# Patient Record
Sex: FEMALE | Race: OTHER | Employment: FULL TIME | URBAN - METROPOLITAN AREA
[De-identification: names, ages, dates, MRNs, and addresses within clinical notes are randomized per-mention and may not be internally consistent; named-entity substitution may affect disease eponyms.]

---

## 2019-07-03 ENCOUNTER — OFFICE VISIT (OUTPATIENT)
Dept: OBGYN CLINIC | Facility: CLINIC | Age: 29
End: 2019-07-03
Payer: COMMERCIAL

## 2019-07-03 VITALS
BODY MASS INDEX: 46.38 KG/M2 | HEIGHT: 62 IN | HEART RATE: 90 BPM | WEIGHT: 252 LBS | SYSTOLIC BLOOD PRESSURE: 111 MMHG | DIASTOLIC BLOOD PRESSURE: 75 MMHG

## 2019-07-03 DIAGNOSIS — Z87.42 HISTORY OF PCOS: ICD-10-CM

## 2019-07-03 DIAGNOSIS — Z01.419 ENCOUNTER FOR GYNECOLOGICAL EXAMINATION WITHOUT ABNORMAL FINDING: Primary | ICD-10-CM

## 2019-07-03 DIAGNOSIS — Z12.4 SCREENING FOR MALIGNANT NEOPLASM OF CERVIX: ICD-10-CM

## 2019-07-03 PROCEDURE — 99385 PREV VISIT NEW AGE 18-39: CPT | Performed by: OBSTETRICS & GYNECOLOGY

## 2019-07-03 PROCEDURE — 99202 OFFICE O/P NEW SF 15 MIN: CPT | Performed by: OBSTETRICS & GYNECOLOGY

## 2019-07-03 RX ORDER — ERGOCALCIFEROL 1.25 MG/1
CAPSULE ORAL
Refills: 0 | COMMUNITY
Start: 2019-03-28 | End: 2019-08-20

## 2019-07-03 NOTE — PROGRESS NOTES
Emily Thompson is a 33year old female  No LMP recorded (lmp unknown). (Menstrual status: IUD - Intrauterine Device). Patient presents with:  Gyn Exam: Annual and discuss IUD removal, ready to conceive.  Concerns for PCOS  she was told by her peds t Sexual activity: Yes        Partners: Male    Lifestyle      Physical activity:        Days per week: Not on file        Minutes per session: Not on file      Stress: Not on file    Relationships      Social connections:        Talks on phone: Not on file no nodules, no adenopathy  Lymphatic:no abnormal supraclavicular or axillary adenopathy is noted  Breast: normal without palpable masses, tenderness, asymmetry, nipple discharge, nipple retraction or skin changes  Respiratory:  lungs clear to auscultation

## 2019-07-29 ENCOUNTER — APPOINTMENT (OUTPATIENT)
Dept: LAB | Facility: HOSPITAL | Age: 29
End: 2019-07-29
Attending: OBSTETRICS & GYNECOLOGY
Payer: COMMERCIAL

## 2019-07-29 ENCOUNTER — OFFICE VISIT (OUTPATIENT)
Dept: OBGYN CLINIC | Facility: CLINIC | Age: 29
End: 2019-07-29
Payer: COMMERCIAL

## 2019-07-29 VITALS
DIASTOLIC BLOOD PRESSURE: 71 MMHG | WEIGHT: 254 LBS | SYSTOLIC BLOOD PRESSURE: 102 MMHG | BODY MASS INDEX: 46 KG/M2 | HEART RATE: 80 BPM

## 2019-07-29 DIAGNOSIS — Z87.42 HISTORY OF PCOS: ICD-10-CM

## 2019-07-29 DIAGNOSIS — Z30.432 ENCOUNTER FOR REMOVAL OF INTRAUTERINE CONTRACEPTIVE DEVICE: Primary | ICD-10-CM

## 2019-07-29 LAB
EST. AVERAGE GLUCOSE BLD GHB EST-MCNC: 100 MG/DL (ref 68–126)
ESTRADIOL SERPL-MCNC: 42.1 PG/ML
FSH SERPL-ACNC: 5.7 MIU/ML
GLUCOSE BLD-MCNC: 100 MG/DL (ref 70–99)
HBA1C MFR BLD HPLC: 5.1 % (ref ?–5.7)
INSULIN SERPL-ACNC: 37.6 MU/L (ref 3–25)
LH SERPL-ACNC: 4.2 MIU/ML
PATIENT FASTING: YES
TSI SER-ACNC: 2.01 MIU/ML (ref 0.36–3.74)

## 2019-07-29 PROCEDURE — 83001 ASSAY OF GONADOTROPIN (FSH): CPT

## 2019-07-29 PROCEDURE — 82670 ASSAY OF TOTAL ESTRADIOL: CPT

## 2019-07-29 PROCEDURE — 83036 HEMOGLOBIN GLYCOSYLATED A1C: CPT

## 2019-07-29 PROCEDURE — 36415 COLL VENOUS BLD VENIPUNCTURE: CPT

## 2019-07-29 PROCEDURE — 83525 ASSAY OF INSULIN: CPT

## 2019-07-29 PROCEDURE — 82947 ASSAY GLUCOSE BLOOD QUANT: CPT

## 2019-07-29 PROCEDURE — 58301 REMOVE INTRAUTERINE DEVICE: CPT | Performed by: OBSTETRICS & GYNECOLOGY

## 2019-07-29 PROCEDURE — 83002 ASSAY OF GONADOTROPIN (LH): CPT

## 2019-07-29 PROCEDURE — 84443 ASSAY THYROID STIM HORMONE: CPT

## 2019-07-29 NOTE — PROCEDURES
IUD Removal   Pt would like to get pregnant and wants IUD removal.  It has been in place for 5 years. We discussed weight loss to help with ovulation and conception. She will also begin pnv.       Pregnancy Results: negative from n/a test   Consent signed

## 2019-07-31 ENCOUNTER — TELEPHONE (OUTPATIENT)
Dept: OBGYN CLINIC | Facility: CLINIC | Age: 29
End: 2019-07-31

## 2019-07-31 DIAGNOSIS — R73.09 ELEVATED GLUCOSE: Primary | ICD-10-CM

## 2019-07-31 NOTE — TELEPHONE ENCOUNTER
----- Message from Hannah Altman MD sent at 7/31/2019 10:53 AM CDT -----  Pt hormone levels are normal but her fasting glucose is slightly elevated and her fasting insulin is elevated.   Please order a 2 hour GTT for her to see if she is diabetic and info

## 2019-07-31 NOTE — TELEPHONE ENCOUNTER
Pt informed of CAPs recs below and verbalized understanding. Pt provided with # to CS to set up appt for 2 hour gtt. Pt informed she will need to fast before this appt.

## 2019-08-20 ENCOUNTER — OFFICE VISIT (OUTPATIENT)
Dept: INTERNAL MEDICINE CLINIC | Facility: CLINIC | Age: 29
End: 2019-08-20
Payer: COMMERCIAL

## 2019-08-20 VITALS
HEIGHT: 62 IN | WEIGHT: 256 LBS | DIASTOLIC BLOOD PRESSURE: 76 MMHG | SYSTOLIC BLOOD PRESSURE: 113 MMHG | BODY MASS INDEX: 47.11 KG/M2 | HEART RATE: 75 BPM | RESPIRATION RATE: 16 BRPM

## 2019-08-20 DIAGNOSIS — Z00.00 ROUTINE PHYSICAL EXAMINATION: ICD-10-CM

## 2019-08-20 DIAGNOSIS — Z87.42 HISTORY OF PCOS: Primary | ICD-10-CM

## 2019-08-20 PROCEDURE — 99385 PREV VISIT NEW AGE 18-39: CPT | Performed by: INTERNAL MEDICINE

## 2019-08-20 RX ORDER — PRENATAL VIT/IRON FUM/FOLIC AC 27MG-0.8MG
1 TABLET ORAL DAILY
COMMUNITY
End: 2020-06-30

## 2019-08-20 NOTE — ASSESSMENT & PLAN NOTE
Patient here to establish care but no significant medical problems and hence complete physical done today. No significant issues of concern other than weight gain most likely due to PCOS and eczema.   Strict diet controlled to restrict starches–fruits, kasia

## 2019-08-20 NOTE — PROGRESS NOTES
HPI:    Patient ID: Harshal Monteiro is a 33year old female. New patient, establish care. Hx of pcos per pt acounts but has not had medical eval.    Has not had regular medical evaluation. Has been seen by gynecology-Dr. car.     Currently not on any 46.82 kg/m². PHYSICAL EXAM:   Physical Exam   Constitutional: She is oriented to person, place, and time. She appears well-developed and well-nourished.    HENT:   Right Ear: External ear normal.   Left Ear: External ear normal.   Nose: Nose normal.   Mo every meal.  Exercise for about 15 to 20 minutes daily. Labs completed per gynecology reviewed. This was done when she had her IUD in place.   So will repeat the [facility],  and insulin after strict dietary restrictions of starches at least 12 hours prior to

## 2019-08-20 NOTE — PATIENT INSTRUCTIONS
Problem List Items Addressed This Visit        Unprioritized    History of PCOS - Primary    Relevant Orders    INSULIN    FSH    LH (LUTEINIZING HORMONE)    Routine physical examination     Patient here to establish care but no significant medical problem

## 2020-02-05 ENCOUNTER — HOSPITAL ENCOUNTER (EMERGENCY)
Facility: HOSPITAL | Age: 30
Discharge: HOME | End: 2020-02-05
Attending: PHYSICIAN ASSISTANT
Payer: COMMERCIAL

## 2020-02-05 VITALS
HEART RATE: 86 BPM | WEIGHT: 235 LBS | RESPIRATION RATE: 17 BRPM | HEIGHT: 60 IN | TEMPERATURE: 99 F | SYSTOLIC BLOOD PRESSURE: 106 MMHG | BODY MASS INDEX: 46.13 KG/M2 | DIASTOLIC BLOOD PRESSURE: 67 MMHG | OXYGEN SATURATION: 97 %

## 2020-02-05 DIAGNOSIS — R31.9 URINARY TRACT INFECTION WITH HEMATURIA, SITE UNSPECIFIED: Primary | ICD-10-CM

## 2020-02-05 DIAGNOSIS — N39.0 URINARY TRACT INFECTION WITH HEMATURIA, SITE UNSPECIFIED: Primary | ICD-10-CM

## 2020-02-05 DIAGNOSIS — R11.2 NAUSEA VOMITING AND DIARRHEA: ICD-10-CM

## 2020-02-05 DIAGNOSIS — R19.7 NAUSEA VOMITING AND DIARRHEA: ICD-10-CM

## 2020-02-05 DIAGNOSIS — R10.9 ABDOMINAL PAIN, ACUTE: ICD-10-CM

## 2020-02-05 LAB
ALBUMIN SERPL-MCNC: 4.1 G/DL (ref 3.4–5)
ALP LIVER SERPL-CCNC: 81 U/L (ref 37–98)
ALT SERPL-CCNC: 45 U/L (ref 13–56)
ANION GAP SERPL CALC-SCNC: 5 MMOL/L (ref 0–18)
AST SERPL-CCNC: 27 U/L (ref 15–37)
B-HCG UR QL: NEGATIVE
BASOPHILS # BLD AUTO: 0.03 X10(3) UL (ref 0–0.2)
BASOPHILS NFR BLD AUTO: 0.2 %
BILIRUB DIRECT SERPL-MCNC: 0.1 MG/DL (ref 0–0.2)
BILIRUB SERPL-MCNC: 0.5 MG/DL (ref 0.1–2)
BILIRUB UR QL: NEGATIVE
BUN BLD-MCNC: 9 MG/DL (ref 7–18)
BUN/CREAT SERPL: 12.2 (ref 10–20)
CALCIUM BLD-MCNC: 9 MG/DL (ref 8.5–10.1)
CHLORIDE SERPL-SCNC: 109 MMOL/L (ref 98–112)
CO2 SERPL-SCNC: 24 MMOL/L (ref 21–32)
COLOR UR: YELLOW
CREAT BLD-MCNC: 0.74 MG/DL (ref 0.55–1.02)
DEPRECATED RDW RBC AUTO: 39.1 FL (ref 35.1–46.3)
EOSINOPHIL # BLD AUTO: 0.07 X10(3) UL (ref 0–0.7)
EOSINOPHIL NFR BLD AUTO: 0.6 %
ERYTHROCYTE [DISTWIDTH] IN BLOOD BY AUTOMATED COUNT: 12.5 % (ref 11–15)
GLUCOSE BLD-MCNC: 89 MG/DL (ref 70–99)
GLUCOSE UR-MCNC: NEGATIVE MG/DL
HCT VFR BLD AUTO: 43 % (ref 35–48)
HGB BLD-MCNC: 14.3 G/DL (ref 12–16)
IMM GRANULOCYTES # BLD AUTO: 0.03 X10(3) UL (ref 0–1)
IMM GRANULOCYTES NFR BLD: 0.2 %
KETONES UR-MCNC: NEGATIVE MG/DL
LIPASE SERPL-CCNC: 164 U/L (ref 73–393)
LYMPHOCYTES # BLD AUTO: 1.34 X10(3) UL (ref 1–4)
LYMPHOCYTES NFR BLD AUTO: 10.6 %
M PROTEIN MFR SERPL ELPH: 7.9 G/DL (ref 6.4–8.2)
MCH RBC QN AUTO: 28.9 PG (ref 26–34)
MCHC RBC AUTO-ENTMCNC: 33.3 G/DL (ref 31–37)
MCV RBC AUTO: 87 FL (ref 80–100)
MONOCYTES # BLD AUTO: 0.63 X10(3) UL (ref 0.1–1)
MONOCYTES NFR BLD AUTO: 5 %
NEUTROPHILS # BLD AUTO: 10.53 X10 (3) UL (ref 1.5–7.7)
NEUTROPHILS # BLD AUTO: 10.53 X10(3) UL (ref 1.5–7.7)
NEUTROPHILS NFR BLD AUTO: 83.4 %
NITRITE UR QL STRIP.AUTO: NEGATIVE
OSMOLALITY SERPL CALC.SUM OF ELEC: 284 MOSM/KG (ref 275–295)
PH UR: 5 [PH] (ref 5–8)
PLATELET # BLD AUTO: 303 10(3)UL (ref 150–450)
POTASSIUM SERPL-SCNC: 3.5 MMOL/L (ref 3.5–5.1)
PROT UR-MCNC: NEGATIVE MG/DL
RBC # BLD AUTO: 4.94 X10(6)UL (ref 3.8–5.3)
RBC #/AREA URNS AUTO: 6 /HPF
SODIUM SERPL-SCNC: 138 MMOL/L (ref 136–145)
SP GR UR STRIP: 1.03 (ref 1–1.03)
UROBILINOGEN UR STRIP-ACNC: <2
WBC # BLD AUTO: 12.6 X10(3) UL (ref 4–11)
WBC #/AREA URNS AUTO: 23 /HPF

## 2020-02-05 PROCEDURE — 96374 THER/PROPH/DIAG INJ IV PUSH: CPT

## 2020-02-05 PROCEDURE — 81001 URINALYSIS AUTO W/SCOPE: CPT | Performed by: PHYSICIAN ASSISTANT

## 2020-02-05 PROCEDURE — 81025 URINE PREGNANCY TEST: CPT | Performed by: PHYSICIAN ASSISTANT

## 2020-02-05 PROCEDURE — S0028 INJECTION, FAMOTIDINE, 20 MG: HCPCS | Performed by: PHYSICIAN ASSISTANT

## 2020-02-05 PROCEDURE — 96361 HYDRATE IV INFUSION ADD-ON: CPT

## 2020-02-05 PROCEDURE — 83690 ASSAY OF LIPASE: CPT | Performed by: PHYSICIAN ASSISTANT

## 2020-02-05 PROCEDURE — 99284 EMERGENCY DEPT VISIT MOD MDM: CPT

## 2020-02-05 PROCEDURE — 80076 HEPATIC FUNCTION PANEL: CPT | Performed by: PHYSICIAN ASSISTANT

## 2020-02-05 PROCEDURE — 96375 TX/PRO/DX INJ NEW DRUG ADDON: CPT

## 2020-02-05 PROCEDURE — 80048 BASIC METABOLIC PNL TOTAL CA: CPT | Performed by: PHYSICIAN ASSISTANT

## 2020-02-05 PROCEDURE — 85025 COMPLETE CBC W/AUTO DIFF WBC: CPT | Performed by: PHYSICIAN ASSISTANT

## 2020-02-05 PROCEDURE — 87086 URINE CULTURE/COLONY COUNT: CPT | Performed by: PHYSICIAN ASSISTANT

## 2020-02-05 RX ORDER — KETOROLAC TROMETHAMINE 30 MG/ML
30 INJECTION, SOLUTION INTRAMUSCULAR; INTRAVENOUS ONCE
Status: COMPLETED | OUTPATIENT
Start: 2020-02-05 | End: 2020-02-05

## 2020-02-05 RX ORDER — FAMOTIDINE 20 MG/1
20 TABLET ORAL 2 TIMES DAILY
Qty: 28 TABLET | Refills: 0 | Status: SHIPPED | OUTPATIENT
Start: 2020-02-05 | End: 2020-02-19

## 2020-02-05 RX ORDER — ONDANSETRON 4 MG/1
4 TABLET, ORALLY DISINTEGRATING ORAL EVERY 6 HOURS PRN
Qty: 10 TABLET | Refills: 0 | Status: SHIPPED | OUTPATIENT
Start: 2020-02-05 | End: 2020-02-08

## 2020-02-05 RX ORDER — CEPHALEXIN 500 MG/1
500 CAPSULE ORAL 3 TIMES DAILY
Qty: 21 CAPSULE | Refills: 0 | Status: SHIPPED | OUTPATIENT
Start: 2020-02-05 | End: 2020-02-12

## 2020-02-05 RX ORDER — FAMOTIDINE 10 MG/ML
20 INJECTION, SOLUTION INTRAVENOUS ONCE
Status: COMPLETED | OUTPATIENT
Start: 2020-02-05 | End: 2020-02-05

## 2020-02-05 RX ORDER — ONDANSETRON 2 MG/ML
4 INJECTION INTRAMUSCULAR; INTRAVENOUS ONCE
Status: COMPLETED | OUTPATIENT
Start: 2020-02-05 | End: 2020-02-05

## 2020-02-05 NOTE — ED PROVIDER NOTES
Patient Seen in: [facility] AND [facility] Emergency Department    History   Patient presents with:  Abdomen/Flank Pain  Nausea/Vomiting/Diarrhea    Stated Complaint: abdominal pain, vomitting    HPI    Vidhya Roth is a 26year old female who presents with ch Alcohol use: Yes      Frequency: Monthly or less      Drinks per session: 1 or 2      Comment: Occasional     Drug use: Never      Review of Systems    Positive for stated complaint: abdominal pain, vomitting  Other systems are as noted in HPI.   Constituti Musculoskeletal system is grossly intact. There is no obvious deformity. Neurological: Gross motor movement is intact in all 4 extremities. Patient exhibits normal speech. Skin: Skin is normal to inspection. Warm and dry. No obvious bruising.   No obv diagnosis)  Nausea vomiting and diarrhea  Abdominal pain, acute    Disposition:  Discharge    Follow-up:  Sheron Rivera MD  [facility] [address] [address]  [phone]    Schedule an appointment as soon as possible for a visit in 2 day

## 2020-03-27 ENCOUNTER — TELEPHONE (OUTPATIENT)
Dept: INTERNAL MEDICINE CLINIC | Facility: CLINIC | Age: 30
End: 2020-03-27

## 2020-03-27 NOTE — TELEPHONE ENCOUNTER
Paging    Message # [phone] 03/27/2020 07:25a [[email or web address]]  To:  From: NELY Mcneil MD:  Phone#:  ----------------------------------------------------------------------  Mat Bloom [phone] RE; FEVER SINCE JITENDRA Kovacs OOLIVIER [birth year]  Page

## 2020-03-29 ENCOUNTER — APPOINTMENT (OUTPATIENT)
Dept: GENERAL RADIOLOGY | Facility: HOSPITAL | Age: 30
End: 2020-03-29
Attending: EMERGENCY MEDICINE
Payer: COMMERCIAL

## 2020-03-29 ENCOUNTER — HOSPITAL ENCOUNTER (EMERGENCY)
Facility: HOSPITAL | Age: 30
Discharge: HOME | End: 2020-03-29
Attending: EMERGENCY MEDICINE
Payer: COMMERCIAL

## 2020-03-29 VITALS
SYSTOLIC BLOOD PRESSURE: 123 MMHG | WEIGHT: 220 LBS | HEIGHT: 61 IN | DIASTOLIC BLOOD PRESSURE: 69 MMHG | TEMPERATURE: 100 F | HEART RATE: 111 BPM | OXYGEN SATURATION: 94 % | RESPIRATION RATE: 24 BRPM | BODY MASS INDEX: 41.54 KG/M2

## 2020-03-29 DIAGNOSIS — J12.82 PNEUMONIA DUE TO 2019 NOVEL CORONAVIRUS: Primary | ICD-10-CM

## 2020-03-29 DIAGNOSIS — U07.1 PNEUMONIA DUE TO 2019 NOVEL CORONAVIRUS: Primary | ICD-10-CM

## 2020-03-29 PROCEDURE — 71045 X-RAY EXAM CHEST 1 VIEW: CPT | Performed by: EMERGENCY MEDICINE

## 2020-03-29 PROCEDURE — 99283 EMERGENCY DEPT VISIT LOW MDM: CPT

## 2020-03-29 RX ORDER — ONDANSETRON 4 MG/1
4 TABLET, ORALLY DISINTEGRATING ORAL ONCE
Status: COMPLETED | OUTPATIENT
Start: 2020-03-29 | End: 2020-03-29

## 2020-03-29 RX ORDER — AZITHROMYCIN 250 MG/1
TABLET, FILM COATED ORAL
Qty: 1 PACKAGE | Refills: 0 | Status: SHIPPED | OUTPATIENT
Start: 2020-03-29 | End: 2020-06-30

## 2020-03-29 RX ORDER — ONDANSETRON 4 MG/1
4 TABLET, ORALLY DISINTEGRATING ORAL EVERY 4 HOURS PRN
Qty: 10 TABLET | Refills: 0 | Status: SHIPPED | OUTPATIENT
Start: 2020-03-29 | End: 2020-04-05

## 2020-03-29 RX ORDER — BENZONATATE 100 MG/1
100 CAPSULE ORAL 3 TIMES DAILY PRN
Qty: 30 CAPSULE | Refills: 0 | Status: SHIPPED | OUTPATIENT
Start: 2020-03-29 | End: 2020-04-28

## 2020-03-29 NOTE — TELEPHONE ENCOUNTER
Spoke with pt. Still with nausea and vomiting. Tested positive recently for COVID. No fever or SOB. Cough is not that bad. I told her to double the dose of the Zofran from 4 to 8mg at a time. Call if no better.

## 2020-03-29 NOTE — ED NOTES
Patient cleared for discharge by MD. Stauffer with patient. Patient discharge instructions reviewed with patient including when and how to follow up  with healthcare provider and when to seek medical treatment.

## 2020-03-29 NOTE — ED PROVIDER NOTES
Patient Seen in: [facility] Emergency Department      History   Patient presents with:  Dyspnea CHARLENE SOB    Stated Complaint: COVID+ SOB    HPI    Patient is a 48-year-old female that is day #6 or 7 of fever and cough.   She tested positive for Covi Neck: Neck supple. Cardiovascular: Normal rate, regular rhythm, normal heart sounds and intact distal pulses. Pulmonary/Chest: Effort normal and breath sounds normal. No respiratory distress. Abdominal: Soft.  Bowel sounds are normal. Exhibits no d 250 mg daily x 4 days  Qty: 1 Package Refills: 0    ondansetron 4 MG Oral Tablet Dispersible  Take 1 tablet (4 mg total) by mouth every 4 (four) hours as needed for Nausea.   Qty: 10 tablet Refills: 0    benzonatate 100 MG Oral Cap  Take 1 capsule (100 mg t

## 2020-04-06 ENCOUNTER — TELEPHONE (OUTPATIENT)
Dept: OTHER | Age: 30
End: 2020-04-06

## 2020-04-06 NOTE — TELEPHONE ENCOUNTER
Spoke w/ pt - advised of Dr. Huang Call recommendation of 2 weeks from 3/29 (= 4/12/20). Pt verbalized understanding.

## 2020-04-06 NOTE — TELEPHONE ENCOUNTER
Spoke with pt ,  verified. Pt informed of  MD recommendation and stated understanding. Pt stated she was in [facility] ER for pneumonia on 3-29-20.   Pt stated her 2 weeks self quarantine was up yesterday, past few days pt feeling much better but yester

## 2020-04-06 NOTE — TELEPHONE ENCOUNTER
Patient is calling with  update has DX of COVID 2 weeks ago    Remains with diarrhea, usually 2-3 times a day , appetite is improving   No fever today , slight cough       Has been in self isolation ,since DX ,   brings her food.  Usually  has her ow

## 2020-04-06 NOTE — TELEPHONE ENCOUNTER
2 weeks of quarantine. Symptoms of fatigue, low-grade temp, abdominal discomfort, loss of appetite and joint pains may persist for a little longer about 4 weeks. She continues to have the risk of shedding virus for about 3 weeks after the quarantine.   No

## 2020-04-11 ENCOUNTER — TELEMEDICINE (OUTPATIENT)
Dept: TELEHEALTH | Age: 30
End: 2020-04-11

## 2020-04-11 DIAGNOSIS — U07.1 COVID-19 VIRUS INFECTION: Primary | ICD-10-CM

## 2020-04-11 DIAGNOSIS — F41.9 ANXIETY: ICD-10-CM

## 2020-04-11 DIAGNOSIS — R07.89 CHEST DISCOMFORT: ICD-10-CM

## 2020-04-11 PROCEDURE — 99203 OFFICE O/P NEW LOW 30 MIN: CPT | Performed by: PHYSICIAN ASSISTANT

## 2020-04-11 RX ORDER — ALPRAZOLAM 0.25 MG/1
0.25 TABLET ORAL EVERY 6 HOURS PRN
Qty: 10 TABLET | Refills: 1 | Status: SHIPPED | OUTPATIENT
Start: 2020-04-11 | End: 2020-06-30

## 2020-04-11 NOTE — PROGRESS NOTES
Telemedicine Visit for Respiratory Illness - Potential COVID-19 Infection    Virtual/Telephone Check-In    Joellen Nguyen verbally consents to video Check-In service on 04/11/20.  Shaheen Goodwin understands and accepts financial responsibility for any deduc • Sick contacts: Yes []     No [x]           Patient Active Problem List:     History of PCOS     BMI 46.1     Routine physical examination    Current Outpatient Medications   Medication Sig Dispense Refill   • ALPRAZolam 0.25 MG Oral Tab Take 1 tablet ( video evaluation is not a substitute for in person examination or emergency care. Patient advised to go to ER or call 911 for worsening symptoms or acute distress.    VANESSA Alarcon

## 2020-04-16 ENCOUNTER — TELEPHONE (OUTPATIENT)
Dept: INTERNAL MEDICINE CLINIC | Facility: CLINIC | Age: 30
End: 2020-04-16

## 2020-04-16 NOTE — TELEPHONE ENCOUNTER
Patient reports she feels, \"100% now--This past Sunday I took myself off of self-isolation. I did a total of 3 weeks of self-isolation, just to make sure. It has been since the Sunday before, not this past Sunday, that I had no symptoms.  I just want to as

## 2020-06-12 ENCOUNTER — PATIENT MESSAGE (OUTPATIENT)
Dept: INTERNAL MEDICINE CLINIC | Facility: CLINIC | Age: 30
End: 2020-06-12

## 2020-06-12 DIAGNOSIS — Z86.16 HISTORY OF 2019 NOVEL CORONAVIRUS DISEASE (COVID-19): Primary | ICD-10-CM

## 2020-06-12 NOTE — TELEPHONE ENCOUNTER
From: Sergei Root  To: Imani Fox MD  Sent: 6/12/2020 9:46 AM CDT  Subject: Other    Hi Dr I wanted to know if I had to do a follow up visit from me being sick of the Covid-19. .?

## 2020-06-12 NOTE — TELEPHONE ENCOUNTER
Dr. Merly James: would you suggest patient make a f/u appt? Had pneumonia 3/29/20. She has been symptom free since End of April.

## 2020-06-16 ENCOUNTER — PATIENT MESSAGE (OUTPATIENT)
Dept: INTERNAL MEDICINE CLINIC | Facility: CLINIC | Age: 30
End: 2020-06-16

## 2020-06-16 NOTE — TELEPHONE ENCOUNTER
From: Tremayne Duran  To: Yu Ames MD  Sent: 6/16/2020 10:12 AM CDT  Subject: Other    Kala Dunne. would you like for me to fast for the blood work that you ordered on June 12?  Also would you like for me to do the bloodwork that you ordered back in Augu

## 2020-06-22 ENCOUNTER — HOSPITAL ENCOUNTER (OUTPATIENT)
Dept: GENERAL RADIOLOGY | Facility: HOSPITAL | Age: 30
Discharge: HOME | End: 2020-06-22
Attending: INTERNAL MEDICINE
Payer: COMMERCIAL

## 2020-06-22 ENCOUNTER — LAB ENCOUNTER (OUTPATIENT)
Dept: LAB | Facility: HOSPITAL | Age: 30
End: 2020-06-22
Attending: INTERNAL MEDICINE
Payer: COMMERCIAL

## 2020-06-22 DIAGNOSIS — Z86.16 HISTORY OF 2019 NOVEL CORONAVIRUS DISEASE (COVID-19): ICD-10-CM

## 2020-06-22 DIAGNOSIS — Z00.00 ROUTINE PHYSICAL EXAMINATION: ICD-10-CM

## 2020-06-22 DIAGNOSIS — Z86.19 PERSONAL HISTORY OF UNSPECIFIED INFECTIOUS AND PARASITIC DISEASE: Primary | ICD-10-CM

## 2020-06-22 DIAGNOSIS — Z87.42 HISTORY OF PCOS: ICD-10-CM

## 2020-06-22 PROCEDURE — 83525 ASSAY OF INSULIN: CPT

## 2020-06-22 PROCEDURE — 82306 VITAMIN D 25 HYDROXY: CPT

## 2020-06-22 PROCEDURE — 82607 VITAMIN B-12: CPT

## 2020-06-22 PROCEDURE — 85025 COMPLETE CBC W/AUTO DIFF WBC: CPT

## 2020-06-22 PROCEDURE — 81001 URINALYSIS AUTO W/SCOPE: CPT

## 2020-06-22 PROCEDURE — 83002 ASSAY OF GONADOTROPIN (LH): CPT

## 2020-06-22 PROCEDURE — 93005 ELECTROCARDIOGRAM TRACING: CPT

## 2020-06-22 PROCEDURE — 80053 COMPREHEN METABOLIC PANEL: CPT

## 2020-06-22 PROCEDURE — 93010 ELECTROCARDIOGRAM REPORT: CPT | Performed by: INTERNAL MEDICINE

## 2020-06-22 PROCEDURE — 80061 LIPID PANEL: CPT

## 2020-06-22 PROCEDURE — 71046 X-RAY EXAM CHEST 2 VIEWS: CPT | Performed by: INTERNAL MEDICINE

## 2020-06-22 PROCEDURE — 84443 ASSAY THYROID STIM HORMONE: CPT

## 2020-06-22 PROCEDURE — 36415 COLL VENOUS BLD VENIPUNCTURE: CPT

## 2020-06-22 PROCEDURE — 83001 ASSAY OF GONADOTROPIN (FSH): CPT

## 2020-06-30 ENCOUNTER — TELEPHONE (OUTPATIENT)
Dept: INTERNAL MEDICINE CLINIC | Facility: CLINIC | Age: 30
End: 2020-06-30

## 2020-06-30 ENCOUNTER — OFFICE VISIT (OUTPATIENT)
Dept: INTERNAL MEDICINE CLINIC | Facility: CLINIC | Age: 30
End: 2020-06-30
Payer: COMMERCIAL

## 2020-06-30 VITALS
HEART RATE: 90 BPM | WEIGHT: 243.19 LBS | TEMPERATURE: 98 F | BODY MASS INDEX: 45.91 KG/M2 | SYSTOLIC BLOOD PRESSURE: 96 MMHG | HEIGHT: 61 IN | DIASTOLIC BLOOD PRESSURE: 67 MMHG

## 2020-06-30 DIAGNOSIS — K21.9 GASTROESOPHAGEAL REFLUX DISEASE WITHOUT ESOPHAGITIS: ICD-10-CM

## 2020-06-30 DIAGNOSIS — R07.89 CHEST DISCOMFORT: Primary | ICD-10-CM

## 2020-06-30 DIAGNOSIS — F41.9 ANXIETY: ICD-10-CM

## 2020-06-30 PROCEDURE — 99213 OFFICE O/P EST LOW 20 MIN: CPT | Performed by: PHYSICIAN ASSISTANT

## 2020-06-30 PROCEDURE — 99212 OFFICE O/P EST SF 10 MIN: CPT | Performed by: PHYSICIAN ASSISTANT

## 2020-06-30 RX ORDER — CYCLOBENZAPRINE HCL 10 MG
10 TABLET ORAL NIGHTLY
Qty: 30 TABLET | Refills: 0 | Status: SHIPPED | OUTPATIENT
Start: 2020-06-30 | End: 2020-07-20

## 2020-06-30 RX ORDER — NICOTINE POLACRILEX 4 MG/1
1 GUM, CHEWING ORAL DAILY
Qty: 30 TABLET | Refills: 0 | Status: SHIPPED | OUTPATIENT
Start: 2020-06-30 | End: 2020-07-30

## 2020-06-30 NOTE — PROGRESS NOTES
HPI:    Patient ID: Trevor Tee is a 26year old female. HPI   Patient presents with intermittent chest discomfort. States she only felt it yesterday and today. Had it before when she had covid.  Per pt she notes it on the left or right side never at Body mass index is 45.95 kg/m². Physical Exam    Constitutional: She is oriented to person, place, and time and thin. She appears well-developed and well-nourished. HENT:   Head: Normocephalic and atraumatic.    Right Ear: Tympanic membrane normal.   [facility] & Referrals:  EKG 12-LEAD         ID#[id number]

## 2020-06-30 NOTE — TELEPHONE ENCOUNTER
States having chest pain that comes and goes,  No dizziness, no shortness of breath, no fever. Pain in under right breast.  Was told Ekg has small  Abnormality. Had tele visit in April and told may have anxiety.     Is concerned about chest pain scheduled

## 2020-07-07 ENCOUNTER — PATIENT MESSAGE (OUTPATIENT)
Dept: INTERNAL MEDICINE CLINIC | Facility: CLINIC | Age: 30
End: 2020-07-07

## 2020-07-07 ENCOUNTER — EKG ENCOUNTER (OUTPATIENT)
Dept: LAB | Age: 30
End: 2020-07-07
Attending: PHYSICIAN ASSISTANT
Payer: COMMERCIAL

## 2020-07-07 DIAGNOSIS — R07.89 CHEST DISCOMFORT: ICD-10-CM

## 2020-07-07 DIAGNOSIS — R07.89 OTHER CHEST PAIN: Primary | ICD-10-CM

## 2020-07-07 PROCEDURE — 93010 ELECTROCARDIOGRAM REPORT: CPT | Performed by: PHYSICIAN ASSISTANT

## 2020-07-07 PROCEDURE — 93005 ELECTROCARDIOGRAM TRACING: CPT

## 2020-07-07 NOTE — TELEPHONE ENCOUNTER
From: Deidra Dsouza  To: Dominique Yao PA-C  Sent: 7/7/2020 12:58 PM CDT  Subject: Prescription Question    Hello     I was wondering if the medicine you prescribed to me if I should still be taking it if I don't feel the pain anymore?

## 2020-07-09 NOTE — TELEPHONE ENCOUNTER
To: Thiago Mcneil      From: Gerri Nuñez RN      Created: 7/9/2020 11:04 AM        Kala Cuenca, The cyclobenzaprine is a muscle relaxer and was given to you to use for 20 days. If you are no longer having the pain, you may stop using the medicine.

## 2020-07-20 ENCOUNTER — TELEPHONE (OUTPATIENT)
Dept: INTERNAL MEDICINE CLINIC | Facility: CLINIC | Age: 30
End: 2020-07-20

## 2020-07-20 NOTE — TELEPHONE ENCOUNTER
If the problem with her is anxiety–she should be started on anxiety medications to be able to work. We cannot keep her off of work just saying that she has anxiety without treating for anxiety.   We are supposed to return everybody to work and treat the co

## 2020-07-20 NOTE — TELEPHONE ENCOUNTER
Patient experiences intermittent episodes of Nervousness and anxiety; Very centered on covid infection or re-infection. Is concerned that the Lexapro recommended could be addictive and is unsure how it will fit in to her life if it causes drowsiness.

## 2020-07-20 NOTE — TELEPHONE ENCOUNTER
Patient was seen by Belén Garsia on 6/30/20 for anxiety with associated chest pain. She is asking if she can have a note for work to be off for three weeks or more.  States she still feels a great deal of anxiety when at work, not sure if due to all the PPE she ne

## 2020-07-22 ENCOUNTER — OFFICE VISIT (OUTPATIENT)
Dept: INTERNAL MEDICINE CLINIC | Facility: CLINIC | Age: 30
End: 2020-07-22
Payer: COMMERCIAL

## 2020-07-22 VITALS
SYSTOLIC BLOOD PRESSURE: 102 MMHG | WEIGHT: 242 LBS | BODY MASS INDEX: 45.69 KG/M2 | HEART RATE: 92 BPM | HEIGHT: 61 IN | DIASTOLIC BLOOD PRESSURE: 71 MMHG

## 2020-07-22 DIAGNOSIS — N92.6 MISSED PERIOD: Primary | ICD-10-CM

## 2020-07-22 DIAGNOSIS — F41.8 ANXIETY ABOUT HEALTH: ICD-10-CM

## 2020-07-22 DIAGNOSIS — F41.9 ANXIETY: ICD-10-CM
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
## 2020-07-22 PROBLEM — R45.89 ANXIETY ABOUT HEALTH: Status: ACTIVE | Noted: 2020-07-22

## 2020-07-22 LAB
CONTROL LINE PRESENT WITH A CLEAR BACKGROUND (YES/NO): YES YES/NO
PREGNANCY TEST, URINE: NEGATIVE

## 2020-07-22 PROCEDURE — 99213 OFFICE O/P EST LOW 20 MIN: CPT | Performed by: NURSE PRACTITIONER

## 2020-07-22 PROCEDURE — 81025 URINE PREGNANCY TEST: CPT | Performed by: NURSE PRACTITIONER

## 2020-07-22 RX ORDER — HYDROCODONE BITARTRATE AND ACETAMINOPHEN 5; 325 MG/1; MG/1
1 TABLET ORAL NIGHTLY
Qty: 7 TABLET | Refills: 0 | Status: SHIPPED | OUTPATIENT
Start: 2020-07-22 | End: 2020-07-29

## 2020-07-22 RX ORDER — DOCUSATE SODIUM 100 MG/1
100 CAPSULE, LIQUID FILLED ORAL 2 TIMES DAILY
Qty: 20 CAPSULE | Refills: 0 | Status: SHIPPED | OUTPATIENT
Start: 2020-07-22 | End: 2020-09-17 | Stop reason: ALTCHOICE
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
## 2020-07-22 NOTE — ASSESSMENT & PLAN NOTE
A/P-26year-old female who was very [place] COVID-19 in the end of March. She obtain these results from [facility]. Since then she has been very anxious.   In review of her chart it appears that another provider had given her alprazolam 0.2 mg which she has n

## 2020-07-22 NOTE — PROGRESS NOTES
HPI:    Patient ID: Ngozi Jacobo is a 26year old female. LMP- Not sure if she is pregnant? Had spotting but has not had a period since June 7 th- June 12th. June 22nd had spotting. COVID-19 POSITVE in March   Patient was COVID-19 positive in March. swelling. Gastrointestinal: Negative for nausea, vomiting, abdominal pain, diarrhea and constipation. Endocrine: Negative for cold intolerance and heat intolerance. Genitourinary: Negative for dysuria and hematuria.    Musculoskeletal: Negative for ba Cuff Size: large)   Pulse 92   Ht 5' 1\" (1.549 m)   Wt 242 lb (109.8 kg)   LMP 06/07/2020 (Approximate)   BMI 45.73 kg/m²   Wt Readings from Last 2 Encounters:  07/22/20 : 242 lb (109.8 kg)  06/30/20 : 243 lb 3.2 oz (110.3 kg)    Body mass index is 45.73 (NORCO) 5-325 MG Oral Tab 7 tablet 0     Sig: Take 1 tablet by mouth nightly for 7 days. • docusate sodium (DULCOLAX STOOL SOFTENER) 100 MG Oral Cap 20 capsule 0     Sig: Take 1 capsule (100 mg total) by mouth 2 (two) times daily.        Imaging & Referra

## 2020-07-25 ENCOUNTER — HOSPITAL ENCOUNTER (OUTPATIENT)
Dept: GENERAL RADIOLOGY | Facility: HOSPITAL | Age: 30
Discharge: HOME | End: 2020-07-25
Attending: NURSE PRACTITIONER
Payer: COMMERCIAL
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
## 2020-07-25 PROCEDURE — 72220 X-RAY EXAM SACRUM TAILBONE: CPT | Performed by: NURSE PRACTITIONER

## 2020-09-17 ENCOUNTER — OFFICE VISIT (OUTPATIENT)
Dept: INTERNAL MEDICINE CLINIC | Facility: CLINIC | Age: 30
End: 2020-09-17
Payer: COMMERCIAL

## 2020-09-17 VITALS
SYSTOLIC BLOOD PRESSURE: 96 MMHG | DIASTOLIC BLOOD PRESSURE: 64 MMHG | OXYGEN SATURATION: 100 % | HEART RATE: 61 BPM | RESPIRATION RATE: 20 BRPM | BODY MASS INDEX: 46.82 KG/M2 | HEIGHT: 61 IN | WEIGHT: 248 LBS

## 2020-09-17 DIAGNOSIS — F41.8 ANXIETY ABOUT HEALTH: ICD-10-CM

## 2020-09-17 DIAGNOSIS — Z86.16 HISTORY OF 2019 NOVEL CORONAVIRUS DISEASE (COVID-19): ICD-10-CM

## 2020-09-17 DIAGNOSIS — Z87.42 HISTORY OF PCOS: ICD-10-CM

## 2020-09-17 DIAGNOSIS — K76.89 LIVER DYSFUNCTION: Primary | ICD-10-CM

## 2020-09-17 PROCEDURE — 99212 OFFICE O/P EST SF 10 MIN: CPT | Performed by: INTERNAL MEDICINE

## 2020-09-17 PROCEDURE — 99214 OFFICE O/P EST MOD 30 MIN: CPT | Performed by: INTERNAL MEDICINE

## 2020-09-17 PROCEDURE — 3074F SYST BP LT 130 MM HG: CPT | Performed by: INTERNAL MEDICINE

## 2020-09-17 PROCEDURE — 3078F DIAST BP <80 MM HG: CPT | Performed by: INTERNAL MEDICINE

## 2020-09-17 PROCEDURE — 3008F BODY MASS INDEX DOCD: CPT | Performed by: INTERNAL MEDICINE

## 2020-09-17 RX ORDER — ERGOCALCIFEROL 1.25 MG/1
50000 CAPSULE ORAL WEEKLY
COMMUNITY
End: 2021-03-30

## 2020-09-17 NOTE — ASSESSMENT & PLAN NOTE
History of PCOS, elevated insulin levels, obesity, hirsutism. She has multiple features of insulin resistance. Discussed options at this time. She would like to stay on diet control, exercise and weight loss track.   Consider starting on metformin if Estuardo Adler

## 2020-09-17 NOTE — ASSESSMENT & PLAN NOTE
Patient was diagnosed with COVID-19 infection in March. She had a relatively mild course of disease. Has recovered well. She does not have any significant sequela from infection. Chest x-ray, labs, EKG without any significant abnormalities.   Her liver

## 2020-09-17 NOTE — ASSESSMENT & PLAN NOTE
Mild liver dysfunction most likely related to being overweight as well as recent COVID infection. Advised to avoid all fatty foods, fried foods and desserts, smaller portion sizes, daily exercise and lose some weight. This should improve the numbers.   Ad

## 2020-09-17 NOTE — PROGRESS NOTES
HPI:    Patient ID: Trevor Tee is a 31year old female. Written by Sadi Flanagan MD on 8/3/2020  6:57 AM   Vitamin D levels are low, please start on vitamin D 50,000 units once a week for the next 6 months.    The prescription will be sent to the  235 lb (106.6 kg)  08/20/19 : 256 lb (116.1 kg)    HENT: Negative. Eyes: Negative. Respiratory: Negative. Cardiovascular: Negative. Gastrointestinal: Negative. Endocrine: Negative. Genitourinary: Negative. Musculoskeletal: Negative. nerve deficit. She exhibits normal muscle tone. Coordination normal.   Skin: No rash noted. No erythema. Psychiatric: Her speech is normal and behavior is normal. Her mood appears anxious. Her affect is labile.    Has had anxiety/panic about thoughts of c deranged which may be secondary to weight gain as well as use of Tylenol. Strict diet control, portion size restriction as discussed and recheck labs. Call if any other concerns.            Liver dysfunction - Primary     Mild liver dysfunction most likel

## 2020-09-17 NOTE — ASSESSMENT & PLAN NOTE
Patient has been dealing her coping with her anxiety is better. She is on therapy at this time which she feels has helped much more than what she expected. She does not want to start on medications.   Will monitor for any worsening symptoms

## 2020-09-17 NOTE — PATIENT INSTRUCTIONS
Problem List Items Addressed This Visit        Unprioritized    Anxiety about health     Patient has been dealing her coping with her anxiety is better. She is on therapy at this time which she feels has helped much more than what she expected.   She does foods and desserts, smaller portion sizes, daily exercise and lose some weight. This should improve the numbers. Additionally reduce Tylenol, reduce all alcohol. Drink plenty of fluids to keep hydrated.   Follow-up labs in about 2 months         Relevant

## 2021-03-05 DIAGNOSIS — Z23 NEED FOR VACCINATION: ICD-10-CM

## 2021-03-17 ENCOUNTER — IMMUNIZATION (OUTPATIENT)
Dept: LAB | Age: 31
End: 2021-03-17
Attending: HOSPITALIST
Payer: COMMERCIAL

## 2021-03-17 DIAGNOSIS — Z23 NEED FOR VACCINATION: Primary | ICD-10-CM

## 2021-03-17 PROCEDURE — 0001A SARSCOV2 VAC 30MCG/0.3ML IM: CPT

## 2021-03-30 DIAGNOSIS — E55.9 VITAMIN D DEFICIENCY: Primary | ICD-10-CM

## 2021-04-02 RX ORDER — ERGOCALCIFEROL 1.25 MG/1
50000 CAPSULE ORAL WEEKLY
Qty: 12 CAPSULE | Refills: 0 | Status: SHIPPED | OUTPATIENT
Start: 2021-04-02 | End: 2023-01-31 | Stop reason: ALTCHOICE

## 2021-04-09 ENCOUNTER — IMMUNIZATION (OUTPATIENT)
Dept: LAB | Age: 31
End: 2021-04-09
Attending: HOSPITALIST
Payer: COMMERCIAL

## 2021-04-09 DIAGNOSIS — Z23 NEED FOR VACCINATION: Primary | ICD-10-CM

## 2021-04-09 PROCEDURE — 0002A SARSCOV2 VAC 30MCG/0.3ML IM: CPT

## 2021-04-19 ENCOUNTER — OFFICE VISIT (OUTPATIENT)
Dept: INTERNAL MEDICINE CLINIC | Facility: CLINIC | Age: 31
End: 2021-04-19
Payer: COMMERCIAL

## 2021-04-19 ENCOUNTER — LAB ENCOUNTER (OUTPATIENT)
Dept: LAB | Facility: HOSPITAL | Age: 31
End: 2021-04-19
Attending: NURSE PRACTITIONER
Payer: COMMERCIAL

## 2021-04-19 VITALS
SYSTOLIC BLOOD PRESSURE: 116 MMHG | HEART RATE: 80 BPM | DIASTOLIC BLOOD PRESSURE: 75 MMHG | WEIGHT: 252.88 LBS | HEIGHT: 61 IN | BODY MASS INDEX: 47.74 KG/M2

## 2021-04-19 DIAGNOSIS — Z11.1 SCREENING FOR TUBERCULOSIS: Primary | ICD-10-CM

## 2021-04-19 DIAGNOSIS — Z11.1 SCREENING FOR TUBERCULOSIS: ICD-10-CM

## 2021-04-19 PROCEDURE — 99395 PREV VISIT EST AGE 18-39: CPT | Performed by: NURSE PRACTITIONER

## 2021-04-19 PROCEDURE — 3008F BODY MASS INDEX DOCD: CPT | Performed by: NURSE PRACTITIONER

## 2021-04-19 PROCEDURE — 86480 TB TEST CELL IMMUN MEASURE: CPT

## 2021-04-19 PROCEDURE — 3074F SYST BP LT 130 MM HG: CPT | Performed by: NURSE PRACTITIONER

## 2021-04-19 PROCEDURE — 36415 COLL VENOUS BLD VENIPUNCTURE: CPT

## 2021-04-19 PROCEDURE — 3078F DIAST BP <80 MM HG: CPT | Performed by: NURSE PRACTITIONER

## 2021-04-19 NOTE — PROGRESS NOTES
HPI:    Patient ID: Obinna Malone is a 31year old female. LMP March 17- March 23rd. pt needs form completion for employeer.  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    Immunization History  Administered            Date(s) Administered    Covid- Laterality Date   • Appendectomy     • Hc  section level i  2011   • Laparoscopic cholecystectomy  2011      Social History    Socioeconomic History      Marital status:       Spouse name: Not on file      Number of children: Not on file reviewed. Constitutional:       Appearance: Normal appearance. She is well-developed. HENT:      Head: Normocephalic.       Right Ear: Tympanic membrane normal.      Left Ear: Tympanic membrane normal.      Nose: Nose normal.      Mouth/Throat:      Faye Encounters:  04/19/21 : 252 lb 14.4 oz (114.7 kg)  09/17/20 : 248 lb (112.5 kg)    Body mass index is 47.79 kg/m². (2)           ASSESSMENT/PLAN:     Problem List Items Addressed This Visit     None      Visit Diagnoses     Screening for tuberculosis    -

## 2021-04-19 NOTE — ASSESSMENT & PLAN NOTE
A/P 26-year-old pleasant female who works in a  is here for physical examination for her work. She is also required to have a [employer] TB test.  Her physical exam is unremarkable.   She currently is not using birth control and does have a new bryce

## 2021-04-21 ENCOUNTER — TELEPHONE (OUTPATIENT)
Dept: INTERNAL MEDICINE CLINIC | Facility: CLINIC | Age: 31
End: 2021-04-21

## 2021-04-21 NOTE — TELEPHONE ENCOUNTER
Called pt and left a voicemail informing that form has been completed and can be picked up at the [address] department . If patient calls please inform it will be at the .

## 2021-06-02 ENCOUNTER — HOSPITAL ENCOUNTER (EMERGENCY)
Facility: HOSPITAL | Age: 31
Discharge: HOME | End: 2021-06-02
Attending: EMERGENCY MEDICINE
Payer: COMMERCIAL

## 2021-06-02 VITALS
SYSTOLIC BLOOD PRESSURE: 161 MMHG | HEART RATE: 90 BPM | OXYGEN SATURATION: 97 % | TEMPERATURE: 97 F | HEIGHT: 60 IN | DIASTOLIC BLOOD PRESSURE: 75 MMHG | BODY MASS INDEX: 43.19 KG/M2 | WEIGHT: 220 LBS | RESPIRATION RATE: 18 BRPM

## 2021-06-02 DIAGNOSIS — J02.9 PHARYNGITIS, UNSPECIFIED ETIOLOGY: Primary | ICD-10-CM

## 2021-06-02 PROCEDURE — 99282 EMERGENCY DEPT VISIT SF MDM: CPT

## 2021-06-07 NOTE — ED PROVIDER NOTES
Patient Seen in: [facility] AND [facility] Emergency Department      History   Patient presents with:  Sore Throat    Stated Complaint: congestion, headache    HPI/Subjective:   HPI    76-year-old female presents for evaluation of sore throat, congestion, headac heart sounds. Pulmonary:      Effort: Pulmonary effort is normal. No respiratory distress. Breath sounds: Normal breath sounds. Musculoskeletal:         General: Normal range of motion. Cervical back: Normal range of motion and neck supple.

## 2021-12-27 ENCOUNTER — TELEPHONE (OUTPATIENT)
Dept: INTERNAL MEDICINE CLINIC | Facility: CLINIC | Age: 31
End: 2021-12-27

## 2021-12-27 ENCOUNTER — IMMUNIZATION (OUTPATIENT)
Dept: LAB | Facility: HOSPITAL | Age: 31
End: 2021-12-27
Attending: EMERGENCY MEDICINE
Payer: COMMERCIAL

## 2021-12-27 DIAGNOSIS — Z23 NEED FOR VACCINATION: Primary | ICD-10-CM

## 2021-12-27 PROCEDURE — 0004A SARSCOV2 VAC 30MCG/0.3ML IM: CPT

## 2021-12-27 NOTE — TELEPHONE ENCOUNTER
Contacted patient, informed form has been completed and placed at [facility]. Also scanned into chart.

## 2021-12-27 NOTE — TELEPHONE ENCOUNTER
Patient states she switched jobs and needs a new form filled out for proof of her physical. She dropped off \"Medical report on adult in  facility\" form. Please complete and call patient when ready for pickup.

## 2021-12-29 ENCOUNTER — TELEPHONE (OUTPATIENT)
Dept: INTERNAL MEDICINE CLINIC | Facility: CLINIC | Age: 31
End: 2021-12-29

## 2021-12-29 DIAGNOSIS — Z86.16 HISTORY OF 2019 NOVEL CORONAVIRUS DISEASE (COVID-19): Primary | ICD-10-CM

## 2021-12-30 NOTE — TELEPHONE ENCOUNTER
----- Message from Geronimo Arrington sent at 12/29/2021  2:04 PM CST -----  Regarding: Covid testing   Hi Dr Clare Gonsales my employer is requesting a covid test before returning to work on January 3rd and I need an order from you to schedule one.

## 2021-12-31 NOTE — TELEPHONE ENCOUNTER
Orders done but rapid test not available,so will take 3 days. may get this from a walk in test site [facility] or [facility]

## 2022-03-19 ENCOUNTER — HOSPITAL ENCOUNTER (EMERGENCY)
Facility: HOSPITAL | Age: 32
Discharge: HOME | End: 2022-03-19
Attending: EMERGENCY MEDICINE
Payer: COMMERCIAL

## 2022-03-19 VITALS
HEIGHT: 61 IN | TEMPERATURE: 98 F | HEART RATE: 89 BPM | SYSTOLIC BLOOD PRESSURE: 120 MMHG | OXYGEN SATURATION: 98 % | BODY MASS INDEX: 43.43 KG/M2 | WEIGHT: 230 LBS | RESPIRATION RATE: 18 BRPM | DIASTOLIC BLOOD PRESSURE: 82 MMHG

## 2022-03-19 DIAGNOSIS — S61.012A THUMB LACERATION, LEFT, INITIAL ENCOUNTER: Primary | ICD-10-CM

## 2022-03-19 PROCEDURE — 12001 RPR S/N/AX/GEN/TRNK 2.5CM/<: CPT

## 2022-03-19 PROCEDURE — 99283 EMERGENCY DEPT VISIT LOW MDM: CPT

## 2022-03-20 NOTE — ED INITIAL ASSESSMENT (HPI)
Pt cut her left thumb on a can of beans about 30 minutes ago. Unable to control the bleeding at home. Unknown last Tdap.

## 2022-03-21 ENCOUNTER — TELEPHONE (OUTPATIENT)
Dept: FAMILY MEDICINE CLINIC | Facility: CLINIC | Age: 32
End: 2022-03-21

## 2023-01-31 ENCOUNTER — OFFICE VISIT (OUTPATIENT)
Dept: FAMILY MEDICINE CLINIC | Facility: CLINIC | Age: 33
End: 2023-01-31

## 2023-01-31 VITALS
HEART RATE: 116 BPM | DIASTOLIC BLOOD PRESSURE: 75 MMHG | HEIGHT: 61 IN | TEMPERATURE: 101 F | BODY MASS INDEX: 47.95 KG/M2 | SYSTOLIC BLOOD PRESSURE: 107 MMHG | WEIGHT: 254 LBS

## 2023-01-31 DIAGNOSIS — J03.90 TONSILLITIS: ICD-10-CM

## 2023-01-31 DIAGNOSIS — J02.9 SORE THROAT: Primary | ICD-10-CM

## 2023-01-31 DIAGNOSIS — H92.01 RIGHT EAR PAIN: ICD-10-CM

## 2023-01-31 DIAGNOSIS — J02.0 STREP THROAT: ICD-10-CM

## 2023-01-31 LAB
CONTROL LINE PRESENT WITH A CLEAR BACKGROUND (YES/NO): YES YES/NO
KIT LOT #: NORMAL NUMERIC
STREP GRP A CUL-SCR: POSITIVE

## 2023-01-31 PROCEDURE — 99202 OFFICE O/P NEW SF 15 MIN: CPT | Performed by: FAMILY MEDICINE

## 2023-01-31 PROCEDURE — 3078F DIAST BP <80 MM HG: CPT | Performed by: FAMILY MEDICINE

## 2023-01-31 PROCEDURE — 3008F BODY MASS INDEX DOCD: CPT | Performed by: FAMILY MEDICINE

## 2023-01-31 PROCEDURE — 3074F SYST BP LT 130 MM HG: CPT | Performed by: FAMILY MEDICINE

## 2023-01-31 PROCEDURE — 87880 STREP A ASSAY W/OPTIC: CPT | Performed by: FAMILY MEDICINE

## 2023-01-31 RX ORDER — AMOXICILLIN 875 MG/1
875 TABLET, COATED ORAL 2 TIMES DAILY
Qty: 20 TABLET | Refills: 0 | Status: SHIPPED | OUTPATIENT
Start: 2023-01-31 | End: 2023-02-10

## 2023-03-06 ENCOUNTER — OFFICE VISIT (OUTPATIENT)
Dept: FAMILY MEDICINE CLINIC | Facility: CLINIC | Age: 33
End: 2023-03-06

## 2023-03-06 VITALS
TEMPERATURE: 97 F | HEIGHT: 61 IN | SYSTOLIC BLOOD PRESSURE: 109 MMHG | DIASTOLIC BLOOD PRESSURE: 72 MMHG | BODY MASS INDEX: 48.9 KG/M2 | WEIGHT: 259 LBS | HEART RATE: 82 BPM

## 2023-03-06 DIAGNOSIS — Z13.1 SCREENING FOR DIABETES MELLITUS: ICD-10-CM

## 2023-03-06 DIAGNOSIS — E55.9 VITAMIN D DEFICIENCY: ICD-10-CM

## 2023-03-06 DIAGNOSIS — Z01.84 IMMUNITY STATUS TESTING: ICD-10-CM

## 2023-03-06 DIAGNOSIS — E53.8 VITAMIN B12 DEFICIENCY: ICD-10-CM

## 2023-03-06 DIAGNOSIS — E66.9 OBESITY (BMI 30-39.9): ICD-10-CM

## 2023-03-06 DIAGNOSIS — L83 ACANTHOSIS NIGRICANS: ICD-10-CM

## 2023-03-06 DIAGNOSIS — Z02.89 ENCOUNTER FOR PHYSICAL EXAMINATION RELATED TO EMPLOYMENT: ICD-10-CM

## 2023-03-06 DIAGNOSIS — Z11.1 TUBERCULOSIS SCREENING: ICD-10-CM

## 2023-03-06 DIAGNOSIS — Z00.00 WELL ADULT EXAM: Primary | ICD-10-CM

## 2023-03-06 PROCEDURE — 3008F BODY MASS INDEX DOCD: CPT | Performed by: FAMILY MEDICINE

## 2023-03-06 PROCEDURE — 3078F DIAST BP <80 MM HG: CPT | Performed by: FAMILY MEDICINE

## 2023-03-06 PROCEDURE — 99395 PREV VISIT EST AGE 18-39: CPT | Performed by: FAMILY MEDICINE

## 2023-03-06 PROCEDURE — G0438 PPPS, INITIAL VISIT: HCPCS | Performed by: FAMILY MEDICINE

## 2023-03-06 PROCEDURE — 3074F SYST BP LT 130 MM HG: CPT | Performed by: FAMILY MEDICINE

## 2023-03-10 ENCOUNTER — LAB ENCOUNTER (OUTPATIENT)
Dept: LAB | Facility: HOSPITAL | Age: 33
End: 2023-03-10
Attending: FAMILY MEDICINE
Payer: COMMERCIAL

## 2023-03-10 DIAGNOSIS — Z01.84 IMMUNITY STATUS TESTING: ICD-10-CM

## 2023-03-10 DIAGNOSIS — E53.8 VITAMIN B12 DEFICIENCY: ICD-10-CM

## 2023-03-10 DIAGNOSIS — Z13.1 SCREENING FOR DIABETES MELLITUS: ICD-10-CM

## 2023-03-10 DIAGNOSIS — Z11.1 TUBERCULOSIS SCREENING: ICD-10-CM

## 2023-03-10 DIAGNOSIS — Z00.00 WELL ADULT EXAM: ICD-10-CM

## 2023-03-10 LAB
ALBUMIN SERPL-MCNC: 3.9 G/DL (ref 3.4–5)
ALBUMIN/GLOB SERPL: 1 {RATIO} (ref 1–2)
ALP LIVER SERPL-CCNC: 86 U/L
ALT SERPL-CCNC: 71 U/L
ANION GAP SERPL CALC-SCNC: 6 MMOL/L (ref 0–18)
AST SERPL-CCNC: 35 U/L (ref 15–37)
BASOPHILS # BLD AUTO: 0.06 X10(3) UL (ref 0–0.2)
BASOPHILS NFR BLD AUTO: 1 %
BILIRUB SERPL-MCNC: 0.8 MG/DL (ref 0.1–2)
BUN BLD-MCNC: 11 MG/DL (ref 7–18)
BUN/CREAT SERPL: 14.5 (ref 10–20)
CALCIUM BLD-MCNC: 9.2 MG/DL (ref 8.5–10.1)
CHLORIDE SERPL-SCNC: 110 MMOL/L (ref 98–112)
CHOLEST SERPL-MCNC: 141 MG/DL (ref ?–200)
CO2 SERPL-SCNC: 27 MMOL/L (ref 21–32)
CREAT BLD-MCNC: 0.76 MG/DL
DEPRECATED RDW RBC AUTO: 40.2 FL (ref 35.1–46.3)
EOSINOPHIL # BLD AUTO: 0.08 X10(3) UL (ref 0–0.7)
EOSINOPHIL NFR BLD AUTO: 1.3 %
ERYTHROCYTE [DISTWIDTH] IN BLOOD BY AUTOMATED COUNT: 12.5 % (ref 11–15)
EST. AVERAGE GLUCOSE BLD GHB EST-MCNC: 103 MG/DL (ref 68–126)
FASTING PATIENT LIPID ANSWER: YES
FASTING STATUS PATIENT QL REPORTED: YES
GFR SERPLBLD BASED ON 1.73 SQ M-ARVRAT: 106 ML/MIN/1.73M2 (ref 60–?)
GLOBULIN PLAS-MCNC: 3.9 G/DL (ref 2.8–4.4)
GLUCOSE BLD-MCNC: 80 MG/DL (ref 70–99)
HBA1C MFR BLD: 5.2 % (ref ?–5.7)
HCT VFR BLD AUTO: 39.7 %
HDLC SERPL-MCNC: 45 MG/DL (ref 40–59)
HGB BLD-MCNC: 13 G/DL
IMM GRANULOCYTES # BLD AUTO: 0.02 X10(3) UL (ref 0–1)
IMM GRANULOCYTES NFR BLD: 0.3 %
LDLC SERPL CALC-MCNC: 79 MG/DL (ref ?–100)
LYMPHOCYTES # BLD AUTO: 2.65 X10(3) UL (ref 1–4)
LYMPHOCYTES NFR BLD AUTO: 42.1 %
MCH RBC QN AUTO: 28.9 PG (ref 26–34)
MCHC RBC AUTO-ENTMCNC: 32.7 G/DL (ref 31–37)
MCV RBC AUTO: 88.2 FL
MONOCYTES # BLD AUTO: 0.36 X10(3) UL (ref 0.1–1)
MONOCYTES NFR BLD AUTO: 5.7 %
NEUTROPHILS # BLD AUTO: 3.12 X10 (3) UL (ref 1.5–7.7)
NEUTROPHILS # BLD AUTO: 3.12 X10(3) UL (ref 1.5–7.7)
NEUTROPHILS NFR BLD AUTO: 49.6 %
NONHDLC SERPL-MCNC: 96 MG/DL (ref ?–130)
OSMOLALITY SERPL CALC.SUM OF ELEC: 294 MOSM/KG (ref 275–295)
PLATELET # BLD AUTO: 297 10(3)UL (ref 150–450)
POTASSIUM SERPL-SCNC: 4 MMOL/L (ref 3.5–5.1)
PROT SERPL-MCNC: 7.8 G/DL (ref 6.4–8.2)
RBC # BLD AUTO: 4.5 X10(6)UL
SODIUM SERPL-SCNC: 143 MMOL/L (ref 136–145)
TRIGL SERPL-MCNC: 88 MG/DL (ref 30–149)
TSI SER-ACNC: 1.86 MIU/ML (ref 0.36–3.74)
VIT B12 SERPL-MCNC: 250 PG/ML (ref 193–986)
VIT D+METAB SERPL-MCNC: 16.6 NG/ML (ref 30–100)
VLDLC SERPL CALC-MCNC: 14 MG/DL (ref 0–30)
WBC # BLD AUTO: 6.3 X10(3) UL (ref 4–11)

## 2023-03-10 PROCEDURE — 82607 VITAMIN B-12: CPT

## 2023-03-10 PROCEDURE — 84443 ASSAY THYROID STIM HORMONE: CPT

## 2023-03-10 PROCEDURE — 86765 RUBEOLA ANTIBODY: CPT

## 2023-03-10 PROCEDURE — 86480 TB TEST CELL IMMUN MEASURE: CPT

## 2023-03-10 PROCEDURE — 83036 HEMOGLOBIN GLYCOSYLATED A1C: CPT

## 2023-03-10 PROCEDURE — 85025 COMPLETE CBC W/AUTO DIFF WBC: CPT

## 2023-03-10 PROCEDURE — 82306 VITAMIN D 25 HYDROXY: CPT | Performed by: FAMILY MEDICINE

## 2023-03-10 PROCEDURE — 80061 LIPID PANEL: CPT

## 2023-03-10 PROCEDURE — 80053 COMPREHEN METABOLIC PANEL: CPT

## 2023-03-10 PROCEDURE — 36415 COLL VENOUS BLD VENIPUNCTURE: CPT

## 2023-03-13 LAB
M TB IFN-G CD4+ T-CELLS BLD-ACNC: 0.04 IU/ML
M TB TUBERC IFN-G BLD QL: NEGATIVE
M TB TUBERC IGNF/MITOGEN IGNF CONTROL: >10 IU/ML
MEV IGG SER-ACNC: 22.7 AU/ML (ref 16.5–?)
QFT TB1 AG MINUS NIL: 0 IU/ML
QFT TB2 AG MINUS NIL: 0.03 IU/ML

## 2023-03-18 DIAGNOSIS — R74.01 ELEVATED ALT MEASUREMENT: Primary | ICD-10-CM

## 2023-03-18 PROBLEM — R45.89 ANXIETY ABOUT HEALTH: Status: RESOLVED | Noted: 2020-07-22 | Resolved: 2023-03-18

## 2023-03-18 PROBLEM — F41.8 ANXIETY ABOUT HEALTH: Status: RESOLVED | Noted: 2020-07-22 | Resolved: 2023-03-18
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
## 2023-03-18 PROBLEM — K76.89 LIVER DYSFUNCTION: Status: RESOLVED | Noted: 2020-09-17 | Resolved: 2023-03-18

## 2023-03-18 PROBLEM — Z00.00 ROUTINE PHYSICAL EXAMINATION: Status: RESOLVED | Noted: 2019-08-20 | Resolved: 2023-03-18

## 2023-03-18 PROBLEM — Z86.16 HISTORY OF 2019 NOVEL CORONAVIRUS DISEASE (COVID-19): Status: RESOLVED | Noted: 2020-09-17 | Resolved: 2023-03-18

## 2023-10-19 ENCOUNTER — HOSPITAL ENCOUNTER (OUTPATIENT)
Age: 33
Discharge: HOME | End: 2023-10-19
Payer: COMMERCIAL

## 2023-10-19 ENCOUNTER — APPOINTMENT (OUTPATIENT)
Dept: GENERAL RADIOLOGY | Age: 33
End: 2023-10-19
Attending: PHYSICIAN ASSISTANT
Payer: COMMERCIAL

## 2023-10-19 VITALS
HEART RATE: 79 BPM | SYSTOLIC BLOOD PRESSURE: 126 MMHG | OXYGEN SATURATION: 100 % | TEMPERATURE: 98 F | DIASTOLIC BLOOD PRESSURE: 86 MMHG | RESPIRATION RATE: 18 BRPM

## 2023-10-19 DIAGNOSIS — S83.422A SPRAIN OF LATERAL COLLATERAL LIGAMENT OF LEFT KNEE, INITIAL ENCOUNTER: Primary | ICD-10-CM

## 2023-10-19 DIAGNOSIS — M25.562 ACUTE PAIN OF LEFT KNEE: ICD-10-CM

## 2023-10-19 PROCEDURE — 73560 X-RAY EXAM OF KNEE 1 OR 2: CPT | Performed by: PHYSICIAN ASSISTANT

## 2023-10-19 RX ORDER — NAPROXEN 500 MG/1
500 TABLET ORAL 2 TIMES DAILY PRN
Qty: 30 TABLET | Refills: 0 | Status: SHIPPED | OUTPATIENT
Start: 2023-10-19

## 2023-10-19 NOTE — ED INITIAL ASSESSMENT (HPI)
Pt came in due to left knee injury that occurred 2 days ago. Pt stated she thinks she bumped it or twisted her knee. Pt c/o of pain and tenderness with activity. Pt has easy non labored respirations.

## 2023-12-14 ENCOUNTER — HOSPITAL ENCOUNTER (OUTPATIENT)
Age: 33
Discharge: HOME | End: 2023-12-14
Payer: COMMERCIAL

## 2023-12-14 VITALS
TEMPERATURE: 99 F | SYSTOLIC BLOOD PRESSURE: 125 MMHG | RESPIRATION RATE: 18 BRPM | OXYGEN SATURATION: 98 % | DIASTOLIC BLOOD PRESSURE: 65 MMHG | HEART RATE: 93 BPM

## 2023-12-14 DIAGNOSIS — J06.9 UPPER RESPIRATORY TRACT INFECTION, UNSPECIFIED TYPE: Primary | ICD-10-CM

## 2023-12-14 DIAGNOSIS — R05.9 COUGH: ICD-10-CM

## 2023-12-14 DIAGNOSIS — R19.7 DIARRHEA OF PRESUMED INFECTIOUS ORIGIN: ICD-10-CM

## 2023-12-14 LAB — SARS-COV-2 RNA RESP QL NAA+PROBE: NOT DETECTED

## 2023-12-14 PROCEDURE — 99213 OFFICE O/P EST LOW 20 MIN: CPT | Performed by: NURSE PRACTITIONER

## 2023-12-14 PROCEDURE — U0002 COVID-19 LAB TEST NON-CDC: HCPCS | Performed by: NURSE PRACTITIONER

## 2023-12-14 RX ORDER — LOPERAMIDE HYDROCHLORIDE 2 MG/1
4 TABLET ORAL AS NEEDED
Qty: 20 TABLET | Refills: 0 | Status: SHIPPED | OUTPATIENT
Start: 2023-12-14 | End: 2024-01-13

## 2023-12-14 RX ORDER — BENZONATATE 200 MG/1
200 CAPSULE ORAL 3 TIMES DAILY PRN
Qty: 15 CAPSULE | Refills: 0 | Status: SHIPPED | OUTPATIENT
Start: 2023-12-14

## 2023-12-14 NOTE — ED INITIAL ASSESSMENT (HPI)
Pt with cough x4 days, then with n/v/d last night and diarrhea and generalized abdominal pain continuing into today; denies fever

## 2024-08-07 ENCOUNTER — HOSPITAL ENCOUNTER (OUTPATIENT)
Age: 34
Discharge: HOME | End: 2024-08-07
Payer: COMMERCIAL

## 2024-08-07 VITALS
SYSTOLIC BLOOD PRESSURE: 114 MMHG | HEART RATE: 81 BPM | DIASTOLIC BLOOD PRESSURE: 60 MMHG | TEMPERATURE: 98 F | RESPIRATION RATE: 18 BRPM | OXYGEN SATURATION: 97 %

## 2024-08-07 DIAGNOSIS — Z20.822 ENCOUNTER FOR LABORATORY TESTING FOR COVID-19 VIRUS: ICD-10-CM

## 2024-08-07 DIAGNOSIS — U07.1 COVID-19 VIRUS INFECTION: Primary | ICD-10-CM

## 2024-08-07 LAB — SARS-COV-2 RNA RESP QL NAA+PROBE: DETECTED

## 2024-08-07 PROCEDURE — U0002 COVID-19 LAB TEST NON-CDC: HCPCS | Performed by: NURSE PRACTITIONER

## 2024-08-07 PROCEDURE — 99213 OFFICE O/P EST LOW 20 MIN: CPT | Performed by: NURSE PRACTITIONER

## 2024-08-07 RX ORDER — ALBUTEROL SULFATE 90 UG/1
2 AEROSOL, METERED RESPIRATORY (INHALATION) EVERY 4 HOURS PRN
Qty: 1 EACH | Refills: 0 | Status: SHIPPED | OUTPATIENT
Start: 2024-08-07 | End: 2024-09-06

## 2024-08-07 RX ORDER — TRIAMCINOLONE ACETONIDE 55 UG/1
1 SPRAY, METERED NASAL 2 TIMES DAILY
Qty: 10.8 ML | Refills: 0 | Status: SHIPPED | OUTPATIENT
Start: 2024-08-07

## 2024-08-07 RX ORDER — NAPROXEN 500 MG/1
500 TABLET ORAL 2 TIMES DAILY PRN
Qty: 20 TABLET | Refills: 0 | Status: SHIPPED | OUTPATIENT
Start: 2024-08-07 | End: 2024-08-17

## 2024-08-07 NOTE — ED INITIAL ASSESSMENT (HPI)
Pt came in due to cough, congestion, body aches, fatigue, chills, and exposure to covid for the past couple of days. Pt has easy non labored respirations.

## 2024-08-07 NOTE — ED PROVIDER NOTES
Patient Seen in: [facility]      History     Chief Complaint   Patient presents with    Cough/URI     Stated Complaint: Covid-19 Test    Subjective:   HPI  Patient is an 33-year-old female that presents to the immediate care center today with concern for fever, chills, cough, congestion that started 2 days ago. Pt has a coworker that recently had covid.  Pt. has been eating and drinking without difficulty.  She has had no shortness of air; no abdominal or back pain; no headache or dizziness.        Objective:   Past Medical History:    Eczema              Past Surgical History:   Procedure Laterality Date    Appendectomy      Hc  section level i  2011    Laparoscopic cholecystectomy  2011                No pertinent social history.            Review of Systems   Constitutional:  Positive for fatigue and fever. Negative for appetite change and chills.   HENT:  Positive for congestion. Negative for sinus pressure.    Respiratory:  Positive for cough.    Gastrointestinal:  Negative for abdominal pain.   Musculoskeletal:  Positive for myalgias. Negative for arthralgias.   Skin:  Negative for rash.   Neurological:  Negative for dizziness, weakness and headaches.       Positive for stated Chief Complaint: Cough/URI    Other systems are as noted in HPI.  Constitutional and vital signs reviewed.      All other systems reviewed and negative except as noted above.    Physical Exam     ED Triage Vitals [24 1013]   /60   Pulse 81   Resp 18   Temp 97.5 °F (36.4 °C)   Temp src Temporal   SpO2 97 %   O2 Device None (Room air)       Current Vitals:   Vital Signs  BP: 114/60  Pulse: 81  Resp: 18  Temp: 97.5 °F (36.4 °C)  Temp src: Temporal    Oxygen Therapy  SpO2: 97 %  O2 Device: None (Room air)            Physical Exam  Vitals and nursing note reviewed.   Constitutional:       General: She is not in acute distress.     Appearance: She is not ill-appearing.   HENT:      Right Ear: Tympanic  membrane and ear canal normal.      Left Ear: Tympanic membrane and ear canal normal.      Nose: Nose normal.   Eyes:      Conjunctiva/sclera: Conjunctivae normal.   Pulmonary:      Effort: Pulmonary effort is normal. No respiratory distress.      Breath sounds: Normal breath sounds.   Musculoskeletal:      Cervical back: Normal range of motion and neck supple.   Skin:     General: Skin is warm and dry.      Findings: No rash.   Neurological:      Mental Status: She is alert and oriented to person, place, and time.               ED Course     Labs Reviewed   RAPID SARS-COV-2 BY PCR - Abnormal; Notable for the following components:       Result Value    Rapid SARS-CoV-2 by PCR Detected (*)     All other components within normal limits                      MDM      Patient's COVID risk ratification was reviewed at bedside: They have been vaccinated against COVID, They are oxygenating well and are not tachypneic. Pt has no significan medical history but is obese . She was advised that her weight is her one risk for complications from covid.     She was provided with information about paxlovid including risks (rebound and dysgeusia) and benefits to minimize her risk for complications. She declined.     Pt was encouraged to orally hydrate, take Tylenol and ibuprofen at home as directed and follow current CDC quarantine guidelines.  Pt. was advised, however, should they develop chest pain or shortness of air, have persistent vomiting, have dizziness they should be urgently reevaluated.  Pt states understanding and will do so.                                      Medical Decision Making  Differential diagnoses considered included, but are not exclusive of: bacterial vs viral sinusitis, dehydration, pneumonia, influenza, Covid-19 infection, and other viral upper respiratory infection.       Problems Addressed:  COVID-19 virus infection: self-limited or minor problem    Amount and/or Complexity of Data Reviewed  Labs:   Decision-making details documented in ED Course.    Risk  OTC drugs.  Prescription drug management.        Disposition and Plan     Clinical Impression:  1. COVID-19 virus infection    2. Encounter for laboratory testing for COVID-19 virus         Disposition:  Discharge  8/7/2024 10:48 am    Follow-up:  Joe Smith MD  [address] [place] [id number]  [phone]      As needed          Medications Prescribed:  Discharge Medication List as of 8/7/2024 10:54 AM        START taking these medications    Details   albuterol 108 (90 Base) MCG/ACT Inhalation Aero Soln Inhale 2 puffs into the lungs every 4 (four) hours as needed for Wheezing., Normal, Disp-1 each, R-0      triamcinolone 55 MCG/ACT Nasal Aerosol 1 spray by Nasal route 2 (two) times daily., Normal, Disp-10.8 mL, R-0      !! naproxen 500 MG Oral Tab Take 1 tablet (500 mg total) by mouth 2 (two) times daily as needed., Normal, Disp-20 tablet, R-0       !! - Potential duplicate medications found. Please discuss with provider.

## 2024-11-11 ENCOUNTER — OFFICE VISIT (OUTPATIENT)
Dept: FAMILY MEDICINE CLINIC | Facility: CLINIC | Age: 34
End: 2024-11-11
Payer: COMMERCIAL

## 2024-11-11 VITALS
DIASTOLIC BLOOD PRESSURE: 76 MMHG | TEMPERATURE: 97 F | SYSTOLIC BLOOD PRESSURE: 119 MMHG | WEIGHT: 269 LBS | BODY MASS INDEX: 50.79 KG/M2 | HEART RATE: 91 BPM | HEIGHT: 61 IN

## 2024-11-11 DIAGNOSIS — R09.81 NASAL CONGESTION: ICD-10-CM

## 2024-11-11 DIAGNOSIS — J34.89 SINUS PRESSURE: Primary | ICD-10-CM

## 2024-11-11 DIAGNOSIS — R05.1 ACUTE COUGH: ICD-10-CM

## 2024-11-11 PROCEDURE — 99213 OFFICE O/P EST LOW 20 MIN: CPT | Performed by: FAMILY MEDICINE

## 2024-11-11 PROCEDURE — 3074F SYST BP LT 130 MM HG: CPT | Performed by: FAMILY MEDICINE

## 2024-11-11 PROCEDURE — 3078F DIAST BP <80 MM HG: CPT | Performed by: FAMILY MEDICINE

## 2024-11-11 PROCEDURE — 3008F BODY MASS INDEX DOCD: CPT | Performed by: FAMILY MEDICINE

## 2024-11-11 RX ORDER — FLUTICASONE PROPIONATE 50 MCG
2 SPRAY, SUSPENSION (ML) NASAL NIGHTLY
Qty: 1 EACH | Refills: 1 | Status: SHIPPED | OUTPATIENT
Start: 2024-11-11 | End: 2025-11-06

## 2024-11-11 RX ORDER — AMOXICILLIN 875 MG/1
875 TABLET, COATED ORAL 2 TIMES DAILY
Qty: 20 TABLET | Refills: 0 | Status: SHIPPED | OUTPATIENT
Start: 2024-11-11 | End: 2024-11-21

## 2024-11-11 NOTE — PROGRESS NOTES
HPI:    Patient ID: Joellen Alcantara is a 34 year old female.      Cough  Associated symptoms include postnasal drip. Pertinent negatives include no chest pain, chills, ear pain, fever, sore throat or shortness of breath.       Chief Complaint   Patient presents with    Cough     On and off since October phlegm still on and off feels congested and sometimes body aches has been taking Cold and Flu had covid in August .       Wt Readings from Last 6 Encounters:   11/11/24 269 lb (122 kg)   03/06/23 259 lb (117.5 kg)   01/31/23 254 lb (115.2 kg)   03/19/22 230 lb (104.3 kg)   06/02/21 220 lb (99.8 kg)   04/19/21 252 lb 14.4 oz (114.7 kg)     BP Readings from Last 3 Encounters:   11/11/24 119/76   08/07/24 114/60   12/14/23 125/65     Patient here with c/o cold, nasal congestion last 1 month  Had covid over a month ago  Lingering cough/ congestion  Some sinus pressure  Cough worse at night  Has voice changes few days ago  No fever  No shortness of breath  Denies allergies/ asthma        Review of Systems   Constitutional:  Negative for chills and fever.   HENT:  Positive for congestion, postnasal drip, sinus pressure and voice change. Negative for ear pain, sore throat, tinnitus and trouble swallowing.    Respiratory:  Positive for cough. Negative for shortness of breath.    Cardiovascular:  Negative for chest pain, palpitations and leg swelling.       /76   Pulse 91   Temp 97.1 °F (36.2 °C) (Temporal)   Ht 5' 1\" (1.549 m)   Wt 269 lb (122 kg)   LMP 10/29/2024 (Exact Date)   BMI 50.83 kg/m²          Current Outpatient Medications   Medication Sig Dispense Refill    amoxicillin 875 MG Oral Tab Take 1 tablet (875 mg total) by mouth 2 (two) times daily for 10 days. 20 tablet 0    fluticasone propionate 50 MCG/ACT Nasal Suspension 2 sprays by Each Nare route nightly. 1 each 1    Ascorbic Acid (VITAMIN C OR) Take by mouth. (Patient not taking: Reported on 11/11/2024)       Allergies:Allergies[1]   PHYSICAL EXAM:      Chief Complaint   Patient presents with    Cough     On and off since October phlegm still on and off feels congested and sometimes body aches has been taking Cold and Flu had covid in August .      Physical Exam  Vitals reviewed.   HENT:      Right Ear: Tympanic membrane normal.      Left Ear: Tympanic membrane normal.      Nose: Congestion present. No rhinorrhea.      Mouth/Throat:      Pharynx: No oropharyngeal exudate or posterior oropharyngeal erythema.   Cardiovascular:      Rate and Rhythm: Normal rate and regular rhythm.      Pulses: Normal pulses.      Heart sounds: Normal heart sounds.   Pulmonary:      Effort: Pulmonary effort is normal.      Breath sounds: Normal breath sounds. No wheezing or rhonchi.   Abdominal:      Tenderness: There is no abdominal tenderness.   Musculoskeletal:      Cervical back: Normal range of motion and neck supple.   Neurological:      Mental Status: She is alert.                ASSESSMENT/PLAN:     Encounter Diagnoses   Name Primary?    Sinus pressure Yes    Nasal congestion     Acute cough        1. Sinus pressure  Take medications as directed. Side effects/ risks discussed and patient verbalized understanding of plan. To follow up if symptoms worsen.    - amoxicillin 875 MG Oral Tab; Take 1 tablet (875 mg total) by mouth 2 (two) times daily for 10 days.  Dispense: 20 tablet; Refill: 0  - fluticasone propionate 50 MCG/ACT Nasal Suspension; 2 sprays by Each Nare route nightly.  Dispense: 1 each; Refill: 1    2. Nasal congestion    - amoxicillin 875 MG Oral Tab; Take 1 tablet (875 mg total) by mouth 2 (two) times daily for 10 days.  Dispense: 20 tablet; Refill: 0  - fluticasone propionate 50 MCG/ACT Nasal Suspension; 2 sprays by Each Nare route nightly.  Dispense: 1 each; Refill: 1    3. Acute cough  Likely from post nasal drip       No orders of the defined types were placed in this encounter.        The above note was creating using Dragon speech recognition technology.  Please excuse any typos    Meds This Visit:  Requested Prescriptions     Signed Prescriptions Disp Refills    amoxicillin 875 MG Oral Tab 20 tablet 0     Sig: Take 1 tablet (875 mg total) by mouth 2 (two) times daily for 10 days.    fluticasone propionate 50 MCG/ACT Nasal Suspension 1 each 1     Si sprays by Each Nare route nightly.       Imaging & Referrals:  None       ID#[id number]       [1] No Known Allergies

## 2025-04-15 ENCOUNTER — LAB ENCOUNTER (OUTPATIENT)
Dept: LAB | Age: 35
End: 2025-04-15
Payer: COMMERCIAL

## 2025-04-15 ENCOUNTER — OFFICE VISIT (OUTPATIENT)
Dept: FAMILY MEDICINE CLINIC | Facility: CLINIC | Age: 35
End: 2025-04-15
Payer: COMMERCIAL

## 2025-04-15 VITALS
BODY MASS INDEX: 51.54 KG/M2 | HEART RATE: 78 BPM | WEIGHT: 273 LBS | OXYGEN SATURATION: 95 % | SYSTOLIC BLOOD PRESSURE: 113 MMHG | HEIGHT: 61 IN | DIASTOLIC BLOOD PRESSURE: 75 MMHG

## 2025-04-15 DIAGNOSIS — Z87.42 HISTORY OF PCOS: ICD-10-CM

## 2025-04-15 DIAGNOSIS — E55.9 VITAMIN D DEFICIENCY: ICD-10-CM

## 2025-04-15 DIAGNOSIS — R74.01 ELEVATED ALT MEASUREMENT: ICD-10-CM

## 2025-04-15 DIAGNOSIS — Z11.1 SCREENING FOR TUBERCULOSIS: ICD-10-CM

## 2025-04-15 DIAGNOSIS — Z00.01 ENCOUNTER FOR GENERAL ADULT MEDICAL EXAMINATION WITH ABNORMAL FINDINGS: ICD-10-CM

## 2025-04-15 DIAGNOSIS — E66.01 CLASS 3 SEVERE OBESITY DUE TO EXCESS CALORIES WITHOUT SERIOUS COMORBIDITY WITH BODY MASS INDEX (BMI) OF 50.0 TO 59.9 IN ADULT (HCC): ICD-10-CM

## 2025-04-15 DIAGNOSIS — Z00.01 ENCOUNTER FOR GENERAL ADULT MEDICAL EXAMINATION WITH ABNORMAL FINDINGS: Primary | ICD-10-CM

## 2025-04-15 DIAGNOSIS — E66.813 CLASS 3 SEVERE OBESITY DUE TO EXCESS CALORIES WITHOUT SERIOUS COMORBIDITY WITH BODY MASS INDEX (BMI) OF 50.0 TO 59.9 IN ADULT (HCC): ICD-10-CM

## 2025-04-15 DIAGNOSIS — H61.21 CERUMEN DEBRIS ON TYMPANIC MEMBRANE OF RIGHT EAR: ICD-10-CM

## 2025-04-15 LAB
ALBUMIN SERPL-MCNC: 4.2 G/DL (ref 3.2–4.8)
ALBUMIN/GLOB SERPL: 1.4 {RATIO} (ref 1–2)
ALP LIVER SERPL-CCNC: 105 U/L (ref 37–98)
ALT SERPL-CCNC: 67 U/L (ref 10–49)
ANION GAP SERPL CALC-SCNC: 8 MMOL/L (ref 0–18)
AST SERPL-CCNC: 37 U/L (ref ?–34)
ATRIAL RATE: 70 BPM
BASOPHILS # BLD AUTO: 0.06 X10(3) UL (ref 0–0.2)
BASOPHILS NFR BLD AUTO: 0.8 %
BILIRUB SERPL-MCNC: 0.4 MG/DL (ref 0.3–1.2)
BUN BLD-MCNC: 9 MG/DL (ref 9–23)
BUN/CREAT SERPL: 12.9 (ref 10–20)
CALCIUM BLD-MCNC: 9.1 MG/DL (ref 8.7–10.4)
CHLORIDE SERPL-SCNC: 107 MMOL/L (ref 98–112)
CHOLEST SERPL-MCNC: 147 MG/DL (ref ?–200)
CO2 SERPL-SCNC: 26 MMOL/L (ref 21–32)
CREAT BLD-MCNC: 0.7 MG/DL (ref 0.55–1.02)
DEPRECATED RDW RBC AUTO: 41.8 FL (ref 35.1–46.3)
EGFRCR SERPLBLD CKD-EPI 2021: 116 ML/MIN/1.73M2 (ref 60–?)
EOSINOPHIL # BLD AUTO: 0.15 X10(3) UL (ref 0–0.7)
EOSINOPHIL NFR BLD AUTO: 2.1 %
ERYTHROCYTE [DISTWIDTH] IN BLOOD BY AUTOMATED COUNT: 12.9 % (ref 11–15)
EST. AVERAGE GLUCOSE BLD GHB EST-MCNC: 114 MG/DL (ref 68–126)
FASTING PATIENT LIPID ANSWER: YES
FASTING STATUS PATIENT QL REPORTED: YES
GLOBULIN PLAS-MCNC: 3.1 G/DL (ref 2–3.5)
GLUCOSE BLD-MCNC: 102 MG/DL (ref 70–99)
HBA1C MFR BLD: 5.6 % (ref ?–5.7)
HCT VFR BLD AUTO: 40.8 % (ref 35–48)
HDLC SERPL-MCNC: 48 MG/DL (ref 40–59)
HGB BLD-MCNC: 13.7 G/DL (ref 12–16)
IMM GRANULOCYTES # BLD AUTO: 0.02 X10(3) UL (ref 0–1)
IMM GRANULOCYTES NFR BLD: 0.3 %
LDLC SERPL CALC-MCNC: 86 MG/DL (ref ?–100)
LYMPHOCYTES # BLD AUTO: 2.56 X10(3) UL (ref 1–4)
LYMPHOCYTES NFR BLD AUTO: 35.8 %
MCH RBC QN AUTO: 29.9 PG (ref 26–34)
MCHC RBC AUTO-ENTMCNC: 33.6 G/DL (ref 31–37)
MCV RBC AUTO: 89.1 FL (ref 80–100)
MONOCYTES # BLD AUTO: 0.35 X10(3) UL (ref 0.1–1)
MONOCYTES NFR BLD AUTO: 4.9 %
NEUTROPHILS # BLD AUTO: 4.02 X10 (3) UL (ref 1.5–7.7)
NEUTROPHILS # BLD AUTO: 4.02 X10(3) UL (ref 1.5–7.7)
NEUTROPHILS NFR BLD AUTO: 56.1 %
NONHDLC SERPL-MCNC: 99 MG/DL (ref ?–130)
OSMOLALITY SERPL CALC.SUM OF ELEC: 291 MOSM/KG (ref 275–295)
P AXIS: 38 DEGREES
P-R INTERVAL: 146 MS
PLATELET # BLD AUTO: 337 10(3)UL (ref 150–450)
POTASSIUM SERPL-SCNC: 4.2 MMOL/L (ref 3.5–5.1)
PROT SERPL-MCNC: 7.3 G/DL (ref 5.7–8.2)
Q-T INTERVAL: 412 MS
QRS DURATION: 80 MS
QTC CALCULATION (BEZET): 444 MS
R AXIS: 47 DEGREES
RBC # BLD AUTO: 4.58 X10(6)UL (ref 3.8–5.3)
SODIUM SERPL-SCNC: 141 MMOL/L (ref 136–145)
T AXIS: 24 DEGREES
TRIGL SERPL-MCNC: 64 MG/DL (ref 30–149)
TSI SER-ACNC: 1.56 UIU/ML (ref 0.55–4.78)
VENTRICULAR RATE: 70 BPM
VIT D+METAB SERPL-MCNC: 20 NG/ML (ref 30–100)
VLDLC SERPL CALC-MCNC: 10 MG/DL (ref 0–30)
WBC # BLD AUTO: 7.2 X10(3) UL (ref 4–11)

## 2025-04-15 PROCEDURE — 80061 LIPID PANEL: CPT

## 2025-04-15 PROCEDURE — 84443 ASSAY THYROID STIM HORMONE: CPT

## 2025-04-15 PROCEDURE — 93005 ELECTROCARDIOGRAM TRACING: CPT

## 2025-04-15 PROCEDURE — 93010 ELECTROCARDIOGRAM REPORT: CPT | Performed by: STUDENT IN AN ORGANIZED HEALTH CARE EDUCATION/TRAINING PROGRAM

## 2025-04-15 PROCEDURE — 3008F BODY MASS INDEX DOCD: CPT

## 2025-04-15 PROCEDURE — 86480 TB TEST CELL IMMUN MEASURE: CPT

## 2025-04-15 PROCEDURE — 36415 COLL VENOUS BLD VENIPUNCTURE: CPT

## 2025-04-15 PROCEDURE — 82306 VITAMIN D 25 HYDROXY: CPT

## 2025-04-15 PROCEDURE — 85025 COMPLETE CBC W/AUTO DIFF WBC: CPT

## 2025-04-15 PROCEDURE — 3078F DIAST BP <80 MM HG: CPT

## 2025-04-15 PROCEDURE — 99214 OFFICE O/P EST MOD 30 MIN: CPT

## 2025-04-15 PROCEDURE — 83036 HEMOGLOBIN GLYCOSYLATED A1C: CPT

## 2025-04-15 PROCEDURE — 80053 COMPREHEN METABOLIC PANEL: CPT

## 2025-04-15 PROCEDURE — 3074F SYST BP LT 130 MM HG: CPT

## 2025-04-15 PROCEDURE — 99395 PREV VISIT EST AGE 18-39: CPT

## 2025-04-15 NOTE — ASSESSMENT & PLAN NOTE
Orders:    Hemoglobin A1C    Lipid Panel; Future    TSH W Reflex To Free T4    OBG Referral - In Network     · 11/28 Intubated emergently on M/S floor for respiratory failure/distress and airway protection in setting of GIB/shock   · CXR: Right base infiltrate; question aspiration    · ETT exchanged over bougie as cuff was not maintaining air - now volumes improved  · Suspect aspiration given imaging and acute decompensation w/large coffee-ground emesis  · CXR w/R lobe infiltrate (likely aspiration)  · Current vent settings: 16/400/70/6  · Currently sedated with Propofol/Dilaudid gtt's w/Goal RASS -1 to -2  · Will require frequent in-line suctioning for suspected aspiration  · Continue IV antibiotics as noted above  · Titrate FiO2 to maintain SpO2 > 90%  · Aggressive pulmonary hygiene

## 2025-04-15 NOTE — PATIENT INSTRUCTIONS
-Instructed to use Debrox daily per instructions for the next two weeks  -Advised to not use Qtips per AAP guidelines  -Gentle external cleansing with moist washcloth following showers  -Return to clinic in 2 weeks for cleaning

## 2025-04-15 NOTE — PROGRESS NOTES
Subjective:   Joellen Alcantara is a 34 year old female who presents for Physical (Annual Physical, pt need work physical form filled out asap would like referral to ob (fabien rodríguez)).    HPI     Patient is here for routine general physical exam.  No changes to chronic medical problems.   Patient is requesting blood testing. Diet and exercise have been fair.  Past medical history, family history, and social history were reviewed.    New concerns discussed during this visit:    Patient would like referral for OB/GYN  due to history of PCOS and patient states that she has been trying to get pregnant and has been dealing with infertility issues patient does have a 14-year-old child.  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  Patient also needs TB screening test done for work.    History/Other:      Chief Complaint Reviewed and Verified  Nursing Notes Reviewed and   Verified  Tobacco Reviewed  Allergies Reviewed  Medications Reviewed    Problem List Reviewed  Medical History Reviewed  Surgical History   Reviewed  OB Status Reviewed  Family History Reviewed  Social History   Reviewed           Tobacco:  She has never smoked tobacco.      Current Medications[1]    Allergies[2]    Depression Screening (PHQ-2/PHQ-9): Over the LAST 2 WEEKS   Little interest or pleasure in doing things: Not at all    Feeling down, depressed, or hopeless: Not at all    PHQ-2 SCORE: 0           Review of Systems   Constitutional: Negative.  Negative for activity change and fatigue.   HENT: Negative.  Negative for congestion, ear pain, rhinorrhea and sneezing.    Eyes: Negative.  Negative for redness.   Respiratory: Negative.  Negative for cough, shortness of breath and wheezing.    Cardiovascular: Negative.  Negative for chest pain.   Gastrointestinal: Negative.  Negative for abdominal pain, constipation, diarrhea, nausea and vomiting.   Endocrine: Negative.    Genitourinary: Negative.   Negative for difficulty urinating and frequency.   Musculoskeletal: Negative.  Negative for back pain, joint swelling and myalgias.   Skin: Negative.  Negative for rash.   Allergic/Immunologic: Negative.    Neurological: Negative.  Negative for dizziness, syncope, light-headedness and headaches.   Hematological: Negative.    Psychiatric/Behavioral: Negative.           Objective:   /75 (BP Location: Right arm, Patient Position: Sitting, Cuff Size: large)   Pulse 78   Ht 5' 1\" (1.549 m)   Wt 273 lb (123.8 kg)   LMP 03/30/2025 (Exact Date)   SpO2 95%   BMI 51.58 kg/m²  Estimated body mass index is 51.58 kg/m² as calculated from the following:    Height as of this encounter: 5' 1\" (1.549 m).    Weight as of this encounter: 273 lb (123.8 kg).      Physical Exam  Vitals and nursing note reviewed.   Constitutional:       Appearance: Normal appearance. She is obese.   HENT:      Head: Normocephalic and atraumatic.      Right Ear: There is impacted cerumen.      Left Ear: Tympanic membrane normal.      Nose: Nose normal.      Mouth/Throat:      Mouth: Mucous membranes are moist.      Pharynx: Oropharynx is clear.   Eyes:      Extraocular Movements: Extraocular movements intact.      Conjunctiva/sclera: Conjunctivae normal.      Pupils: Pupils are equal, round, and reactive to light.   Cardiovascular:      Rate and Rhythm: Normal rate and regular rhythm.      Pulses: Normal pulses.      Heart sounds: Normal heart sounds.   Pulmonary:      Effort: Pulmonary effort is normal.      Breath sounds: Normal breath sounds.   Abdominal:      General: Abdomen is flat. Bowel sounds are normal.      Palpations: Abdomen is soft.   Musculoskeletal:         General: Normal range of motion.      Cervical back: Normal range of motion and neck supple.   Skin:     General: Skin is warm and dry.   Neurological:      General: No focal deficit present.      Mental Status: She is alert and oriented to person, place, and time. Mental  status is at baseline.   Psychiatric:         Mood and Affect: Mood normal.         Behavior: Behavior normal.         Thought Content: Thought content normal.         Judgment: Judgment normal.           Assessment & Plan:     Assessment & Plan  Encounter for general adult medical examination with abnormal findings  -Exam is unremarkable  -Screening tests/lab were discussed   -Discussed with the patient about age appropriate screening and immunization.  -Advised healthy lifestyle with regular exercise and modification of the diet  -Advised 30 minutes of aerobic exercise or weightbearing exercise daily for at least 5 times a week.  -Advised to increase the intake of vegetables, fibers and lean protein.   -Avoid high fat, high cholesterol, high carb diet.  -Avoid processed, frozen food and sweetened beverages.  -Advised to call for any new problems and follow up for further management after testings have been done    Orders:    CBC With Differential With Platelet; Future    Comp Metabolic Panel (14); Future    Hemoglobin A1C    Lipid Panel; Future    TSH W Reflex To Free T4    EKG 12 Lead; Future    Class 3 severe obesity due to excess calories without serious comorbidity with body mass index (BMI) of 50.0 to 59.9 in adult (HCC)  Discussed with the patient obesity complications including risk for cardiovascular risk events, also degenerative arthritis of joints and spine.  A healthy BMI is 25 and less.  Try to decrease white flour products and carbohydrates such as less potatos, rice, tortillas, bread, pasta, etc. Eat smaller portions and avoid eating late at night, especially before bed. Try to not skip meals as many times your body will feel that you are trying to starve yourself and therefore slow down your metabolism and  prevent you from losing weight. Also try and do some sort of aerobic exercise, such as brisk walks for 30 minutes 4-5 times per week.    Orders:    Hemoglobin A1C    Lipid Panel; Future    TSH W  Reflex To Free T4    EKG 12 Lead; Future    Elevated ALT measurement    Orders:    Comp Metabolic Panel (14); Future    Vitamin D deficiency    Orders:    Vitamin D; Future    History of PCOS    Orders:    Hemoglobin A1C    Lipid Panel; Future    TSH W Reflex To Free T4    OBG Referral - In Network    Screening for tuberculosis    Orders:    Quantiferon TB Plus; Future    Cerumen debris on tympanic membrane of right ear  -Instructed to use Debrox daily per instructions for the next two weeks  -Advised to not use Qtips per AAP guidelines  -Gentle external cleansing with moist washcloth following showers  -Return to clinic in 2 weeks for cleaning            Medication use, effects and side effects discussed in detail with patient. The patient indicated understanding of the diagnosis and agreed with the plan of care.    Return in about 1 year (around 4/15/2026) for Annual Physical.    SARAY Hickman       [1]   Current Outpatient Medications   Medication Sig Dispense Refill    fluticasone propionate 50 MCG/ACT Nasal Suspension 2 sprays by Each Nare route nightly. (Patient not taking: Reported on 4/15/2025) 1 each 1    Ascorbic Acid (VITAMIN C OR) Take by mouth. (Patient not taking: Reported on 4/15/2025)     [2] No Known Allergies

## 2025-04-18 LAB
M TB IFN-G CD4+ T-CELLS BLD-ACNC: 0.05 IU/ML
M TB TUBERC IFN-G BLD QL: NEGATIVE
M TB TUBERC IGNF/MITOGEN IGNF CONTROL: >10 IU/ML
QFT TB1 AG MINUS NIL: -0.01 IU/ML
QFT TB2 AG MINUS NIL: -0.01 IU/ML

## 2025-04-22 ENCOUNTER — TELEPHONE (OUTPATIENT)
Dept: FAMILY MEDICINE CLINIC | Facility: CLINIC | Age: 35
End: 2025-04-22

## 2025-04-22 NOTE — TELEPHONE ENCOUNTER
Pt called back for Results , relayed    1)Asking if Physical Work form is ready  - need before Thursday         2) Pt stated a discussion was made to see Dr Gaytan for weight management ,asking if need a Referral, advised do not but need further advised d/t abnormal liver enzymes     Please Advise

## 2025-04-24 NOTE — LETTER
[address]  [facility] [address]  Dept: [phone]    PATIENT NAME: Allie Viera  PATIENT MRN: [id number]  PRIMARY CARE PHYSICIAN: Mike Montenegro, APRN - CNP    HPI:      Allie Viera 1/31/2023          To Whom It May Concern:    Almas Wolff is currently under my medical care and may not return to work at this time. Please excuse Joellen for 2 days. She may return to work on Thursday, 2/2/2023. Activity is restricted as follows: none. If you require additional information please contact our office. Sincerely,        [facility]. MD Sarah          Document generated by:  Jessi Lopez MD

## 2025-05-08 ENCOUNTER — E-VISIT (OUTPATIENT)
Dept: TELEHEALTH | Age: 35
End: 2025-05-08
Payer: COMMERCIAL

## 2025-05-08 DIAGNOSIS — R09.81 NASAL CONGESTION: Primary | ICD-10-CM

## 2025-05-09 PROCEDURE — 99421 OL DIG E/M SVC 5-10 MIN: CPT | Performed by: PHYSICIAN ASSISTANT

## 2025-05-09 NOTE — PROGRESS NOTES
Joellen Alcantara is a 34 year old female.  HPI:   See answers to questions above.     Current Medications[1]   Past Medical History[2]   Past Surgical History[3]   Family History[4]   Social History:  Short Social Hx on File[5]      ASSESSMENT AND PLAN:     Encounter Diagnosis   Name Primary?    Nasal congestion Yes       Sx improving, likely viral. discussed otc meds and nasal saline rinse that pt can try to help with congestion and irritated nasal tissue. Discussed when to seek in person care.    Meds & Refills for this Visit:  Requested Prescriptions      No prescriptions requested or ordered in this encounter       Duration of  the service:  10 minutes    Patient advised to follow up with PCP if no improvement or worsening of symptoms  Refer to [employer] message for specific patient instructions                   [1]   Current Outpatient Medications   Medication Sig Dispense Refill    ergocalciferol 1.25 MG (77969 UT) Oral Cap Take 1 capsule (50,000 Units total) by mouth once a week. 24 capsule 0    fluticasone propionate 50 MCG/ACT Nasal Suspension 2 sprays by Each Nare route nightly. (Patient not taking: Reported on 4/15/2025) 1 each 1    Ascorbic Acid (VITAMIN C OR) Take by mouth. (Patient not taking: Reported on 4/15/2025)     [2]   Past Medical History:   Eczema   [3]   Past Surgical History:  Procedure Laterality Date    Appendectomy      Hc  section level i  2011    Laparoscopic cholecystectomy     [4]   Family History  Problem Relation Age of Onset    Diabetes Mother     Hypertension Mother     Cancer Sister 40        thyroid    Depression Sister     Diabetes Maternal Grandmother     Diabetes Maternal Grandfather     Cancer Paternal Grandfather         lung,smoker   [5]   Social History  Socioeconomic History    Marital status:    Tobacco Use    Smoking status: Never    Smokeless tobacco: Never   Vaping Use    Vaping status: Never Used   Substance and Sexual Activity    Alcohol use:  Yes     Comment: Occasional 1-2x/month    Drug use: Yes     Comment: occ    Sexual activity: Yes     Partners: Male     Social Drivers of Health     Transportation Needs: No Transportation Needs (8/7/2019)    Received from [facility]    PRAPARE - Transportation     Lack of Transportation (Medical): No     Lack of Transportation (Non-Medical): No

## 2025-05-13 ENCOUNTER — OFFICE VISIT (OUTPATIENT)
Dept: OBGYN CLINIC | Facility: CLINIC | Age: 35
End: 2025-05-13
Payer: COMMERCIAL

## 2025-05-13 VITALS
BODY MASS INDEX: 51 KG/M2 | DIASTOLIC BLOOD PRESSURE: 73 MMHG | WEIGHT: 270 LBS | HEART RATE: 74 BPM | SYSTOLIC BLOOD PRESSURE: 113 MMHG

## 2025-05-13 DIAGNOSIS — Z01.419 ENCOUNTER FOR WELL WOMAN EXAM: Primary | ICD-10-CM

## 2025-05-13 DIAGNOSIS — Z12.4 SCREENING FOR CERVICAL CANCER: ICD-10-CM

## 2025-05-13 PROCEDURE — 99385 PREV VISIT NEW AGE 18-39: CPT | Performed by: STUDENT IN AN ORGANIZED HEALTH CARE EDUCATION/TRAINING PROGRAM

## 2025-05-13 PROCEDURE — 3078F DIAST BP <80 MM HG: CPT | Performed by: STUDENT IN AN ORGANIZED HEALTH CARE EDUCATION/TRAINING PROGRAM

## 2025-05-13 PROCEDURE — 3074F SYST BP LT 130 MM HG: CPT | Performed by: STUDENT IN AN ORGANIZED HEALTH CARE EDUCATION/TRAINING PROGRAM

## 2025-05-13 NOTE — PROGRESS NOTES
[facility]  Obstetrics and Gynecology  Annual  Karin Sargent PA-C    Chief Complaint   Patient presents with    New Patient    Gynecologic Exam     Pap. Reviewed Preventative/Wellness form with patient.        Joellen Alcantara is a 34 year old female  presenting today for her annual well woman exam. Patient's last menstrual period was 2025 (exact date). History of irregular periods, since  notes cycles have become more regular. Sexually active with same partner, declined STD screening. She planning on conceiving in near future. She denies any abnormal vaginal discharge, odor, irritation, or itching. No breast pain or masses. No dysuria or hematuria.    Pap:2019   Contraception:None  Gardasil: not completed      OBSTETRICS HISTORY:     OB History    Para Term  AB Living   1 1 1   1   SAB IAB Ectopic Multiple Live Births       1      # Outcome Date GA Lbr Tray/2nd Weight Sex Type Anes PTL Lv   1 Term     M CS-Unspec   GERBER       GYNE HISTORY:     Menarche: 11-12 (2025  7:36 AM)  Period Cycle (Days): Irreguar (2025  7:36 AM)  Period Duration (Days): 7days (2025  7:36 AM)  Period Flow: Heavy (2025  7:36 AM)  Use of Birth Control (if yes, specify type): None (2025  7:36 AM)  Date When Birth Control Last Used: Mirena  (2025  7:36 AM)  Hx Prior Abnormal Pap: No (2025  7:36 AM)  Pap Date: 19 (2025  7:36 AM)  Pap Result Notes: Pap-normal/HPV-neg  (2025  7:36 AM)      History   Sexual Activity    Sexual activity: Yes    Partners: Male           Latest Ref Rng & Units 7/3/2019    10:51 AM   RECENT PAP RESULTS   INTERPRETATION/RESULT: Negative for intraepithelial lesion or malignancy Negative for intraepithelial lesion or malignancy          MEDICAL HISTORY:     Past Medical History[1]   Past Surgical History[2]    SOCIAL HISTORY:     Tobacco Use: Low Risk  (2025)    Patient History     Smoking Tobacco Use: Never     Smokeless Tobacco Use:  Never     Passive Exposure: Not on file       Depression Screening:   Depression Screening (PHQ-2/PHQ-9): Over the LAST 2 WEEKS   Little interest or pleasure in doing things (over the last two weeks)?: Not at all    Feeling down, depressed, or hopeless (over the last two weeks)?: Not at all    PHQ-2 SCORE: 0          FAMILY HISTORY:     Family History[3]    MEDICATIONS:     Medications - Current[4]    ALLERGIES:     Allergies[5]    REVIEW OF SYSTEMS:     Review of Systems   Constitutional:  Negative for chills, fever and unexpected weight change.   Respiratory: Negative.     Cardiovascular: Negative.    Gastrointestinal:  Negative for abdominal pain, constipation, diarrhea and nausea.   Genitourinary:  Negative for dyspareunia, dysuria, genital sores, hematuria, menstrual problem, pelvic pain, vaginal bleeding, vaginal discharge and vaginal pain.   Musculoskeletal: Negative.    Skin: Negative.    Neurological: Negative.    Hematological: Negative.    Psychiatric/Behavioral: Negative.           PHYSICAL EXAM:     Vitals:    05/13/25 0732   BP: 113/73   Pulse: 74   Weight: 270 lb (122.5 kg)       Body mass index is 51.02 kg/m².     Chaperone offered, pt declined.     Constitutional: well developed, well nourished  Psychiatric:  Oriented to time, place, person and situation. Appropriate mood and affect  Head/Face: normocephalic  Neck/Thyroid: thyroid symmetric, no thyromegaly, no nodules, no adenopathy  Lymphatic:no abnormal supraclavicular or axillary adenopathy is noted  Breast: normal without palpable masses, tenderness, asymmetry, nipple discharge, nipple retraction or skin changes  Abdomen:  soft, nontender, nondistended, no masses  Skin/Hair: no unusual rashes or bruises  Extremities: no edema, no cyanosis    Pelvic Exam:  External Genitalia: normal appearance, hair distribution, and no lesions  Urethral Meatus:  normal in size, location, without lesions and prolapse  Bladder:  No fullness, masses or  tenderness  Vagina:  Normal appearance without lesions, no abnormal discharge  Cervix:  Normal without tenderness on motion  Uterus: normal in size, contour, position, mobility, without tenderness  Adnexa: normal without masses or tenderness  Perineum: normal  Anus: no hemorroids       ASSESSMENT:     Joellen was seen today for new patient and gynecologic exam.    Diagnoses and all orders for this visit:    Encounter for well woman exam    Screening for cervical cancer  -     ThinPrep PAP Smear; Future  -     Hpv Dna  High Risk , Thin Prep Collect; Future        PLAN:   Normal exam.  Pap smear and GC/Chlamydia cervical cultures done.   Recommend repeat pap smear with co-testing every 3 years for normal/ -HPV.  Contraceptive counseling completed.  Screening mammogram recommended starting at 40 unless significant family history or concerning symptoms.  Maintain healthy lifestyle with well-balance diet and daily exercise.   Return to clinic in one year or as needed.        SUMMARY:  Pap: Next cotest 3-5 years per ASCCP guidelines.  BCM:  None  STD screening: declines  Mammogram: n/a -- once 39 yrs old  HM updated    FOLLOW-UP     No follow-ups on file.    ELLIOTT SIDDIQUI PA-C  7:56 AM  5/13/2025    Note to patient and family:  The 21st Century Cures Act makes medical notes available to patients in the interest of transparency.  However, please be advised that this is a medical document.  It is intended as a peer to peer communication.  It is written in medical language and may contain abbreviations or verbiage that are technical and unfamiliar.  It may appear blunt or direct.  Medical documents are intended to carry relevant information, facts as evident, and the clinical opinion of the practitioner.         [1]   Past Medical History:  Diagnosis Date    Allergic rhinitis 1/2012    Decorative tattoo     Have 15    Dysmenorrhea     Sometimes i get really bad cramps    Eczema     Headache    [2]   Past Surgical  History:  Procedure Laterality Date    Appendectomy      Appendectomy        2011    Cholecystectomy  2011      section level i  2011    Insert intrauterine device      Laparoscopic cholecystectomy      Remove intrauterine device  2018   [3]   Family History  Problem Relation Age of Onset    Diabetes Mother     Hypertension Mother     Asthma Mother     Cancer Sister 40        thyroid    Depression Sister     Colon Cancer Sister     Depression Sister     Diabetes Maternal Grandmother     Diabetes Maternal Grandfather     Cancer Paternal Grandfather         lung,smoker   [4]   Current Outpatient Medications:     ergocalciferol 1.25 MG (09871 UT) Oral Cap, Take 1 capsule (50,000 Units total) by mouth once a week., Disp: 24 capsule, Rfl: 0  [5] No Known Allergies

## 2025-05-14 LAB — HPV E6+E7 MRNA CVX QL NAA+PROBE: NEGATIVE

## 2025-06-05 ENCOUNTER — APPOINTMENT (OUTPATIENT)
Dept: GENERAL RADIOLOGY | Facility: HOSPITAL | Age: 35
End: 2025-06-05
Attending: EMERGENCY MEDICINE
Payer: OTHER MISCELLANEOUS

## 2025-06-05 ENCOUNTER — HOSPITAL ENCOUNTER (EMERGENCY)
Facility: HOSPITAL | Age: 35
Discharge: HOME | End: 2025-06-05
Attending: EMERGENCY MEDICINE
Payer: OTHER MISCELLANEOUS

## 2025-06-05 VITALS
WEIGHT: 270 LBS | DIASTOLIC BLOOD PRESSURE: 86 MMHG | TEMPERATURE: 99 F | BODY MASS INDEX: 53.01 KG/M2 | RESPIRATION RATE: 20 BRPM | HEART RATE: 87 BPM | HEIGHT: 60 IN | SYSTOLIC BLOOD PRESSURE: 124 MMHG | OXYGEN SATURATION: 98 %

## 2025-06-05 DIAGNOSIS — S93.409A MILD ANKLE SPRAIN, UNSPECIFIED LATERALITY, INITIAL ENCOUNTER: Primary | ICD-10-CM

## 2025-06-05 PROCEDURE — 99283 EMERGENCY DEPT VISIT LOW MDM: CPT

## 2025-06-05 PROCEDURE — 99284 EMERGENCY DEPT VISIT MOD MDM: CPT

## 2025-06-05 PROCEDURE — 73610 X-RAY EXAM OF ANKLE: CPT | Performed by: EMERGENCY MEDICINE

## 2025-06-05 RX ORDER — IBUPROFEN 600 MG/1
600 TABLET, FILM COATED ORAL EVERY 8 HOURS PRN
Qty: 15 TABLET | Refills: 0 | Status: SHIPPED | OUTPATIENT
Start: 2025-06-05

## 2025-06-05 NOTE — ED PROVIDER NOTES
Patient Seen in: [facility] Emergency Department        History  Chief Complaint   Patient presents with    Ankle Injury     Stated Complaint: fall    Subjective:   HPI            34-year-old female describes running and stepping on uneven ground and inverting her left ankle.  She complains of pain.  Worse with weightbearing      Objective:     Past Medical History:    Allergic rhinitis    Decorative tattoo    Have 15    Dysmenorrhea    Sometimes i get really bad cramps    Eczema    Headache              Past Surgical History:   Procedure Laterality Date    Appendectomy      Appendectomy  1998      2011    Cholecystectomy  2011    Hc  section level i  2011    Insert intrauterine device      Laparoscopic cholecystectomy      Remove intrauterine device  2018                Social History     Socioeconomic History    Marital status:    Tobacco Use    Smoking status: Never    Smokeless tobacco: Never   Vaping Use    Vaping status: Never Used   Substance and Sexual Activity    Alcohol use: Yes     Comment: Special occasions    Drug use: Yes     Comment: occ    Sexual activity: Yes     Partners: Male     Social Drivers of Health     Food Insecurity: No Food Insecurity (2025)    NCSS - Food Insecurity     Worried About Running Out of Food in the Last Year: No     Ran Out of Food in the Last Year: No   Transportation Needs: No Transportation Needs (2025)    NCSS - Transportation     Lack of Transportation: No   Housing Stability: Not At Risk (2025)    NCSS - Housing/Utilities     Has Housing: Yes     Worried About Losing Housing: No     Unable to Get Utilities: No                                Physical Exam    ED Triage Vitals [25 1709]   /86   Pulse 87   Resp 20   Temp 99 °F (37.2 °C)   Temp src Oral   SpO2 98 %   O2 Device None (Room air)       Current Vitals:   Vital Signs  BP: 124/86  Pulse: 87  Resp: 20  Temp: 99 °F (37.2 °C)  Temp src:  Oral  MAP (mmHg): 99    Oxygen Therapy  SpO2: 98 %  O2 Device: None (Room air)            Physical Exam  Left ankle no proximal swelling tenderness or redness.  There is soft tissue swelling of the lateral aspect of her ankle no medial tenderness no Achilles tenderness no fifth metatarsal tenderness joint is stable circulation sensorimotor functions intact        ED Course  Labs Reviewed - No data to display       Will check x-ray                  MDM     Use of independent historian:     I personally reviewed and interpreted the images : No fracture seen    XR ANKLE (MIN 3 VIEWS), LEFT (CPT=73610)  Result Date: 6/5/2025  CONCLUSION:   No radiographically visible acute osseous injury of the left ankle.    elm-remote  Dictated by (CST): Thomas Ansari MD on 6/05/2025 at 6:35 PM     Finalized by (CST): Thomas Ansari MD on 6/05/2025 at 6:36 PM            Vitals:    06/05/25 1709   BP: 124/86   Pulse: 87   Resp: 20   Temp: 99 °F (37.2 °C)   TempSrc: Oral   SpO2: 98%   Weight: 122.5 kg   Height: 152.4 cm (5')     *I personally reviewed and interpreted all ED vitals.    Pulse Ox: 98%, Room air, Normal         Differential Diagnosis/ Diagnostic Considerations: Left ankle injury consider fracture consider sprain consider dislocation consider fifth metatarsal injury consider Achilles    Medical Record Review: I personally reviewed available prior medical records for any recent pertinent discharge summaries, testing, and procedures and reviewed those reports and found .    Complicating Factors: The patient already has  which contribute to the complexity of this ED evaluation.    Social determinants of health:    Prescription drug management:      Shared Decision Making:    ED Course: X-ray findings shared with patient will treat with ice Ace rest ibuprofen.    Discussion of management with other healthcare providers:    Condition upon leaving the department: Stable          Medical Decision Making      Disposition and Plan      Clinical Impression:  1. Mild ankle sprain, unspecified laterality, initial encounter         Disposition:  Discharge  6/5/2025  6:47 pm    Follow-up:  Joe Smith MD  [address] [place] [id number] [phone]    Follow up in 3 day(s)            Medications Prescribed:  Current Discharge Medication List        START taking these medications    Details   ibuprofen 600 MG Oral Tab Take 1 tablet (600 mg total) by mouth every 8 (eight) hours as needed.  Qty: 15 tablet, Refills: 0                   Supplementary Documentation:

## 2025-06-05 NOTE — ED INITIAL ASSESSMENT (HPI)
Per pt she was running rolled her left ankle happened 1030 AM today at work.  Denies fall.  Pt is A/OX 4, breathing unlabored.  Took 2 tablet of Tylenol around 11 AM.

## 2025-06-11 ENCOUNTER — PATIENT OUTREACH (OUTPATIENT)
Dept: CASE MANAGEMENT | Age: 35
End: 2025-06-11

## 2025-06-11 NOTE — PROGRESS NOTES
Patient had recent Emergency Room visit, calling to offer Primary Care Physician follow-up appointment (discharged 06/05)    [place] [address]  [phone]    Attempt #1:  Left message on voicemail for patient to call transitions specialist back to schedule follow up appointments. Provided Transitions specialist scheduling phone number [phone].

## 2025-06-12 NOTE — PROGRESS NOTES
ED Hospital Follow up for PCP(discharged 06/05 el)    PCP  Joe Smith  Family Medicine  [address], [place] [id number] [phone]  unable to contact pt after multiple attempt  Attempt #2: Line range / disconnected; unable to leave vm   Closing encounter

## 2025-06-16 ENCOUNTER — LAB ENCOUNTER (OUTPATIENT)
Dept: LAB | Age: 35
End: 2025-06-16
Payer: COMMERCIAL

## 2025-06-16 ENCOUNTER — OFFICE VISIT (OUTPATIENT)
Dept: FAMILY MEDICINE CLINIC | Facility: CLINIC | Age: 35
End: 2025-06-16
Payer: COMMERCIAL

## 2025-06-16 VITALS
DIASTOLIC BLOOD PRESSURE: 84 MMHG | TEMPERATURE: 97 F | OXYGEN SATURATION: 98 % | HEIGHT: 60 IN | WEIGHT: 272 LBS | HEART RATE: 65 BPM | RESPIRATION RATE: 18 BRPM | SYSTOLIC BLOOD PRESSURE: 128 MMHG | BODY MASS INDEX: 53.4 KG/M2

## 2025-06-16 DIAGNOSIS — S93.402D MILD SPRAIN OF LEFT ANKLE, SUBSEQUENT ENCOUNTER: Primary | ICD-10-CM

## 2025-06-16 DIAGNOSIS — R74.8 ELEVATED LIVER ENZYMES: ICD-10-CM

## 2025-06-16 LAB
HAV IGM SER QL: NONREACTIVE
HBV CORE IGM SER QL: NONREACTIVE
HBV SURFACE AG SERPL QL IA: NONREACTIVE
HCV AB SERPL QL IA: NONREACTIVE

## 2025-06-16 PROCEDURE — 36415 COLL VENOUS BLD VENIPUNCTURE: CPT

## 2025-06-16 PROCEDURE — 3008F BODY MASS INDEX DOCD: CPT

## 2025-06-16 PROCEDURE — 3074F SYST BP LT 130 MM HG: CPT

## 2025-06-16 PROCEDURE — 3079F DIAST BP 80-89 MM HG: CPT

## 2025-06-16 PROCEDURE — 80074 ACUTE HEPATITIS PANEL: CPT

## 2025-06-16 PROCEDURE — 99213 OFFICE O/P EST LOW 20 MIN: CPT

## 2025-06-16 RX ORDER — PREDNISONE 20 MG/1
20 TABLET ORAL 2 TIMES DAILY
Qty: 10 TABLET | Refills: 0 | Status: SHIPPED | OUTPATIENT
Start: 2025-06-16 | End: 2025-06-21

## 2025-06-16 NOTE — PROGRESS NOTES
Subjective:   Joellen Alcantara is a 34 year old female who presents for Ankle Injury (Teresa- ankle injury at worked. Per Er she sprained her ankle. Pt complains of constant pain and feel pulses ).     HPI   Patient is here for follow up from the ER for fall and ankle pain and was diagnosed with mild ankle sprain. Patient is being treated with ibuprofen. Patient states symptoms are worse.  Patient was taking medication and icing her foot but her pain has not improved. She continues to have swelling and feels like her ankle is cramping up. Patient states she can feel like a pulse in her ankle and like she is about ur sprain it again.      History/Other:      Chief Complaint Reviewed and Verified  Nursing Notes Reviewed and   Verified  Tobacco Reviewed  Allergies Reviewed  Medications Reviewed    Problem List Reviewed  Medical History Reviewed  Surgical History   Reviewed  Family History Reviewed           Tobacco:  She has never smoked tobacco.      Current Medications[1]    Allergies[2]      Review of Systems   Constitutional: Negative.  Negative for activity change and fatigue.   HENT: Negative.  Negative for congestion, ear pain, rhinorrhea and sneezing.    Eyes: Negative.  Negative for redness.   Respiratory: Negative.  Negative for cough, shortness of breath and wheezing.    Cardiovascular: Negative.  Negative for chest pain.   Gastrointestinal: Negative.  Negative for abdominal pain, constipation, diarrhea, nausea and vomiting.   Endocrine: Negative.    Genitourinary: Negative.  Negative for difficulty urinating and frequency.   Musculoskeletal:  Positive for joint swelling. Negative for back pain and myalgias.   Skin: Negative.  Negative for rash.   Allergic/Immunologic: Negative.    Neurological: Negative.  Negative for dizziness, syncope, light-headedness and headaches.   Hematological: Negative.    Psychiatric/Behavioral: Negative.           Objective:   /84 (BP Location: Right arm, Patient  Position: Sitting, Cuff Size: adult)   Pulse 65   Temp 97.4 °F (36.3 °C) (Temporal)   Resp 18   Ht 5' (1.524 m)   Wt 272 lb (123.4 kg)   LMP 05/05/2025 (Exact Date)   SpO2 98%   BMI 53.12 kg/m²  Estimated body mass index is 53.12 kg/m² as calculated from the following:    Height as of this encounter: 5' (1.524 m).    Weight as of this encounter: 272 lb (123.4 kg).      Physical Exam  Vitals and nursing note reviewed.   Constitutional:       Appearance: Normal appearance. She is normal weight.   HENT:      Head: Normocephalic and atraumatic.      Right Ear: Tympanic membrane normal.      Left Ear: Tympanic membrane normal.      Nose: Nose normal.      Mouth/Throat:      Mouth: Mucous membranes are moist.      Pharynx: Oropharynx is clear.   Eyes:      Extraocular Movements: Extraocular movements intact.      Conjunctiva/sclera: Conjunctivae normal.      Pupils: Pupils are equal, round, and reactive to light.   Cardiovascular:      Rate and Rhythm: Normal rate and regular rhythm.      Pulses: Normal pulses.      Heart sounds: Normal heart sounds.   Pulmonary:      Effort: Pulmonary effort is normal.      Breath sounds: Normal breath sounds.   Abdominal:      General: Abdomen is flat. Bowel sounds are normal.      Palpations: Abdomen is soft.   Musculoskeletal:      Cervical back: Normal range of motion and neck supple.      Left ankle: Swelling present. Tenderness present. Decreased range of motion.        Legs:    Skin:     General: Skin is warm and dry.   Neurological:      General: No focal deficit present.      Mental Status: She is alert and oriented to person, place, and time. Mental status is at baseline.   Psychiatric:         Mood and Affect: Mood normal.         Behavior: Behavior normal.         Thought Content: Thought content normal.         Judgment: Judgment normal.           Assessment & Plan:     Assessment & Plan  Mild sprain of left ankle, subsequent encounter  -Advised to take medications  as directed  -Patient can take OTC tylenol/ibuprofen for breakthrough pain  -Stretches/ice/elevate/massages for 10-15 minutes 3-4 times a day  -physical therapy orders given    Orders:    predniSONE 20 MG Oral Tab; Take 1 tablet (20 mg total) by mouth 2 (two) times daily for 5 days.    Physical Therapy Referral - [facility]      Medication use, effects and side effects discussed in detail with patient. The patient indicated understanding of the diagnosis and agreed with the plan of care.    Return in about 1 week (around 6/23/2025).    SARAY Hickman       [1]   Current Outpatient Medications   Medication Sig Dispense Refill    predniSONE 20 MG Oral Tab Take 1 tablet (20 mg total) by mouth 2 (two) times daily for 5 days. 10 tablet 0    ibuprofen 600 MG Oral Tab Take 1 tablet (600 mg total) by mouth every 8 (eight) hours as needed. 15 tablet 0    ergocalciferol 1.25 MG (50130 UT) Oral Cap Take 1 capsule (50,000 Units total) by mouth once a week. 24 capsule 0   [2] No Known Allergies

## 2025-06-18 ENCOUNTER — TELEPHONE (OUTPATIENT)
Dept: PHYSICAL THERAPY | Facility: HOSPITAL | Age: 35
End: 2025-06-18

## 2025-06-23 ENCOUNTER — OFFICE VISIT (OUTPATIENT)
Dept: FAMILY MEDICINE CLINIC | Facility: CLINIC | Age: 35
End: 2025-06-23
Payer: COMMERCIAL

## 2025-06-23 VITALS
DIASTOLIC BLOOD PRESSURE: 70 MMHG | HEART RATE: 70 BPM | SYSTOLIC BLOOD PRESSURE: 103 MMHG | WEIGHT: 270 LBS | OXYGEN SATURATION: 96 % | BODY MASS INDEX: 53.01 KG/M2 | HEIGHT: 60 IN

## 2025-06-23 DIAGNOSIS — S93.402D MILD SPRAIN OF LEFT ANKLE, SUBSEQUENT ENCOUNTER: Primary | ICD-10-CM

## 2025-06-23 PROCEDURE — 99213 OFFICE O/P EST LOW 20 MIN: CPT

## 2025-06-23 PROCEDURE — 3008F BODY MASS INDEX DOCD: CPT

## 2025-06-23 PROCEDURE — 3078F DIAST BP <80 MM HG: CPT

## 2025-06-23 PROCEDURE — 3074F SYST BP LT 130 MM HG: CPT

## 2025-06-23 RX ORDER — PREDNISONE 20 MG/1
20 TABLET ORAL DAILY
COMMUNITY

## 2025-06-23 NOTE — PROGRESS NOTES
Subjective:   Joellen Alcantara is a 34 year old female who presents for Follow - Up (Follow up on ankle she states it is doing better and is having slight pain when she moves ankle. ).     HPI   Patient presents for follow up on her ankle injury. Patient was given prednisone which helped with the swelling and pain. Patient states that she is able to move her ankle and walk without pain. She did not start physical therapy due to insurance issue and states she will wait to see if her ankle gets worse.     History/Other:      Chief Complaint Reviewed and Verified  Nursing Notes Reviewed and   Verified  Tobacco Reviewed  Allergies Reviewed  Medications Reviewed    Problem List Reviewed  Medical History Reviewed  Surgical History   Reviewed  OB Status Reviewed  Family History Reviewed  Social History   Reviewed           Tobacco:  She has never smoked tobacco.      Current Medications[1]    Allergies[2]      Review of Systems   Constitutional: Negative.  Negative for activity change and fatigue.   HENT: Negative.  Negative for congestion, ear pain, rhinorrhea and sneezing.    Eyes: Negative.  Negative for redness.   Respiratory: Negative.  Negative for cough, shortness of breath and wheezing.    Cardiovascular: Negative.  Negative for chest pain.   Gastrointestinal: Negative.  Negative for abdominal pain, constipation, diarrhea, nausea and vomiting.   Endocrine: Negative.    Genitourinary: Negative.  Negative for difficulty urinating and frequency.   Musculoskeletal:  Positive for myalgias. Negative for back pain and joint swelling.   Skin: Negative.  Negative for rash.   Allergic/Immunologic: Negative.    Neurological: Negative.  Negative for dizziness, syncope, light-headedness and headaches.   Hematological: Negative.    Psychiatric/Behavioral: Negative.           Objective:   /70 (BP Location: Right arm, Patient Position: Sitting, Cuff Size: adult)   Pulse 70   Ht 5' (1.524 m)   Wt 270 lb (122.5 kg)    LMP 06/10/2025 (Exact Date)   SpO2 96%   BMI 52.73 kg/m²  Estimated body mass index is 52.73 kg/m² as calculated from the following:    Height as of this encounter: 5' (1.524 m).    Weight as of this encounter: 270 lb (122.5 kg).      Physical Exam  Vitals and nursing note reviewed.   Constitutional:       Appearance: Normal appearance. She is normal weight.   HENT:      Head: Normocephalic and atraumatic.      Right Ear: Tympanic membrane normal.      Left Ear: Tympanic membrane normal.      Nose: Nose normal.      Mouth/Throat:      Mouth: Mucous membranes are moist.      Pharynx: Oropharynx is clear.   Eyes:      Extraocular Movements: Extraocular movements intact.      Conjunctiva/sclera: Conjunctivae normal.      Pupils: Pupils are equal, round, and reactive to light.   Cardiovascular:      Rate and Rhythm: Normal rate and regular rhythm.      Pulses: Normal pulses.      Heart sounds: Normal heart sounds.   Pulmonary:      Effort: Pulmonary effort is normal.      Breath sounds: Normal breath sounds.   Abdominal:      General: Abdomen is flat. Bowel sounds are normal.      Palpations: Abdomen is soft.   Musculoskeletal:         General: Normal range of motion.      Cervical back: Normal range of motion and neck supple.   Skin:     General: Skin is warm and dry.   Neurological:      General: No focal deficit present.      Mental Status: She is alert and oriented to person, place, and time. Mental status is at baseline.   Psychiatric:         Mood and Affect: Mood normal.         Behavior: Behavior normal.         Thought Content: Thought content normal.         Judgment: Judgment normal.           Assessment & Plan:     Assessment & Plan  Mild sprain of left ankle, subsequent encounter  -Resolved   -Advised to continue current management   -Patient can take OTC tylenol/ibuprofen for breakthrough pain  -Stretches/moist warm compresses/massages for 10-15 minutes 3-4 times a day             Medication use,  effects and side effects discussed in detail with patient. The patient indicated understanding of the diagnosis and agreed with the plan of care.    Return if symptoms worsen or fail to improve.    SARAY Hickman       [1]   Current Outpatient Medications   Medication Sig Dispense Refill    predniSONE 20 MG Oral Tab Take 1 tablet (20 mg total) by mouth daily.      ibuprofen 600 MG Oral Tab Take 1 tablet (600 mg total) by mouth every 8 (eight) hours as needed. 15 tablet 0    ergocalciferol 1.25 MG (20219 UT) Oral Cap Take 1 capsule (50,000 Units total) by mouth once a week. 24 capsule 0   [2] No Known Allergies

## (undated) NOTE — LETTER
7/22/2020              [address]         To whom it may concern,    Jose Carmona is under my care and will not be able to return to work until August 10, 2020.       Sincerely,    Syed Dsouza, APRN

## (undated) NOTE — LETTER
6/16/2025          To Whom It May Concern:    Joellen Alcantara is currently under my medical care and may return to work at this time.    Please excuse Joellen for 1 weeks.  Activity is restricted as follows: no climbing, no lifting, and no prolonged standing.      If you require additional information please contact our office.        Sincerely,    SARAY Hickman           Document generated by:  SARAY Hickman

## (undated) NOTE — LETTER
AUTHORIZATION FOR SURGICAL OPERATION OR OTHER PROCEDURE    1.  I hereby authorize Dr. Hermelindo Hernandez, and [facility]3][facility] staff assigned to my case to perform the following operation and/or procedure at the [facility]3][facility]:    ______________________IUD Removal _ Patient Name:  ______________________________________________________  (please print)      Patient signature:  ___________________________________________________             Relationship to Patient:           []  Parent    Responsible person

## (undated) NOTE — LETTER
Date & Time: 10/19/2023, 9:08 AM  Patient: Wilman Elizalde  Encounter Provider(s):    Angelo Simms       To Whom It May Concern:    Wilman Elizalde was seen and treated in our department on 10/19/2023. She may return to work on Monday, October 23, 2023. No restrictions.     If you have any questions or concerns, please do not hesitate to call.        _____________________________  Physician/APC Signature

## (undated) NOTE — LETTER
Patient Name: Tremayne Duran  : 10/8/1990  MRN: [id number]  Patient Address: [facility] [place] [id number]      Coronavirus Disease 2019 (COVID-19)     [facility] is committed to the safety and well-being of our patients, members, employe your symptoms get worse, call your healthcare provider immediately. 3. Get rest and stay hydrated.    4. If you have a medical appointment, call the healthcare provider ahead of time and tell them that you have or may have COVID-19.  5. For medical emergen fever-reducing medications; and  · Improvement in respiratory symptoms (e.g., cough, shortness of breath); and  · At least 10 days have passed since symptoms first appeared OR if asymptomatic patient or date of symptom onset is unclear then use 10 days pos donors must:    · Have had a confirmed diagnosis of COVID-19  · Be symptom-free for at least 14 days*    *Some people will be required to have a repeat COVID-19 test in order to be eligible to donate.  If you’re instructed by Chrissy that a repeat test is r random. Researchers are trying to identify similarities between people with a Post-COVID condition to better understand if there are risk factors. How do I prevent a Post-COVID condition?   The best way to prevent the long-term symptoms of COVID-19 is

## (undated) NOTE — ED AVS SNAPSHOT
Shaheen Goodwin   MRN: [id number]    Department:  [facility] Emergency Department   Date of Visit:  2/5/2020           Disclosure     Insurance plans vary and the physician(s) referred by the ER may not be covered by your plan.  Please contact yo CARE PHYSICIAN AT ONCE OR RETURN IMMEDIATELY TO THE EMERGENCY DEPARTMENT. If you have been prescribed any medication(s), please fill your prescription right away and begin taking the medication(s) as directed.   If you believe that any of the medications

## (undated) NOTE — LETTER
7/3/2019          To Whom It May Concern:    Jannette Dodd is currently under my medical care and may return to work at this time. Please excuse Joellen for 1 days for her office visit today. She may return to work today.   Activity is restricted as fol

## (undated) NOTE — LETTER
Date & Time: 8/7/2024, 10:48 AM  Patient: Joellen Alcantara  Encounter Provider(s):    Latrell Irvin APRN       To Whom It May Concern:    Joellen Alcantara was seen and treated in our department on 8/7/2024. She should not return to work until 8/12/24 .    If you have any questions or concerns, please do not hesitate to call.        _____________________________  Physician/APC Signature

## (undated) NOTE — LETTER
Date & Time: 12/14/2023, 9:04 AM  Patient: Pamella Vargas  Encounter Provider(s):    SARAY Holloway       To Whom It May Concern:    Pamella Vargas was seen and treated in our department on 12/14/2023. She should not return to work until 12/18/23 .     If you have any questions or concerns, please do not hesitate to call.        _____________________________  Physician/APC Signature

## (undated) NOTE — LETTER
1/6/2022              [address]         Dear Sharri Salcido,    This letter is to inform you that our office has made several attempts to reach you by phone without success.   We were attempting to contact you by